# Patient Record
Sex: MALE | Race: WHITE | URBAN - METROPOLITAN AREA
[De-identification: names, ages, dates, MRNs, and addresses within clinical notes are randomized per-mention and may not be internally consistent; named-entity substitution may affect disease eponyms.]

---

## 2017-09-26 ENCOUNTER — TELEPHONE (OUTPATIENT)
Dept: ADMINISTRATIVE | Age: 77
End: 2017-09-26

## 2019-01-26 LAB
ALBUMIN SERPL-MCNC: 4.5 G/DL (ref 3.9–4.9)
ALP BLD-CCNC: 62 U/L (ref 35–104)
ALT SERPL-CCNC: 16 U/L (ref 0–41)
AST SERPL-CCNC: 21 U/L (ref 0–40)
BILIRUB SERPL-MCNC: 0.7 MG/DL (ref 0–1.2)
BILIRUBIN DIRECT: <0.2 MG/DL (ref 0–0.3)
BILIRUBIN, INDIRECT: NORMAL MG/DL (ref 0–0.6)
CHOLESTEROL, TOTAL: 96 MG/DL (ref 0–199)
HDLC SERPL-MCNC: 45 MG/DL (ref 40–59)
LDL CHOLESTEROL CALCULATED: 34 MG/DL (ref 0–129)
TOTAL PROTEIN: 7.2 G/DL (ref 6.4–8.1)
TRIGL SERPL-MCNC: 85 MG/DL (ref 0–200)

## 2024-01-21 ENCOUNTER — HOSPITAL ENCOUNTER (EMERGENCY)
Age: 84
Discharge: HOME | End: 2024-01-21
Attending: FAMILY MEDICINE
Payer: MEDICARE

## 2024-01-21 ENCOUNTER — APPOINTMENT (OUTPATIENT)
Dept: GENERAL RADIOLOGY | Age: 84
End: 2024-01-21
Payer: MEDICARE

## 2024-01-21 VITALS
SYSTOLIC BLOOD PRESSURE: 136 MMHG | BODY MASS INDEX: 27.74 KG/M2 | OXYGEN SATURATION: 96 % | HEART RATE: 66 BPM | HEIGHT: 68 IN | DIASTOLIC BLOOD PRESSURE: 86 MMHG | TEMPERATURE: 97.3 F | RESPIRATION RATE: 16 BRPM | WEIGHT: 183 LBS

## 2024-01-21 DIAGNOSIS — R06.02 SHORTNESS OF BREATH: Primary | ICD-10-CM

## 2024-01-21 LAB
ALBUMIN SERPL-MCNC: 4.4 G/DL (ref 3.5–4.6)
ALP SERPL-CCNC: 78 U/L (ref 35–104)
ALT SERPL-CCNC: 8 U/L (ref 0–41)
ANION GAP SERPL CALCULATED.3IONS-SCNC: 14 MEQ/L (ref 9–15)
AST SERPL-CCNC: 15 U/L (ref 0–40)
BASOPHILS # BLD: 0.1 K/UL (ref 0–0.2)
BASOPHILS NFR BLD: 1 %
BILIRUB SERPL-MCNC: 0.5 MG/DL (ref 0.2–0.7)
BNP BLD-MCNC: 246 PG/ML
BUN SERPL-MCNC: 14 MG/DL (ref 8–23)
CALCIUM SERPL-MCNC: 9 MG/DL (ref 8.5–9.9)
CHLORIDE SERPL-SCNC: 101 MEQ/L (ref 95–107)
CO2 SERPL-SCNC: 24 MEQ/L (ref 20–31)
CREAT SERPL-MCNC: 1.32 MG/DL (ref 0.7–1.2)
EOSINOPHIL # BLD: 0 K/UL (ref 0–0.7)
EOSINOPHIL NFR BLD: 0.1 %
ERYTHROCYTE [DISTWIDTH] IN BLOOD BY AUTOMATED COUNT: 13.3 % (ref 11.5–14.5)
GLOBULIN SER CALC-MCNC: 3.1 G/DL (ref 2.3–3.5)
GLUCOSE SERPL-MCNC: 157 MG/DL (ref 70–99)
HCT VFR BLD AUTO: 46.2 % (ref 42–52)
HGB BLD-MCNC: 15.7 G/DL (ref 14–18)
LYMPHOCYTES # BLD: 2.2 K/UL (ref 1–4.8)
LYMPHOCYTES NFR BLD: 16 %
MAGNESIUM SERPL-MCNC: 1.9 MG/DL (ref 1.7–2.4)
MCH RBC QN AUTO: 29.8 PG (ref 27–31.3)
MCHC RBC AUTO-ENTMCNC: 34 % (ref 33–37)
MCV RBC AUTO: 87.8 FL (ref 79–92.2)
MONOCYTES # BLD: 1.3 K/UL (ref 0.2–0.8)
MONOCYTES NFR BLD: 19 %
MYELOCYTES NFR BLD MANUAL: 1 %
NEUTROPHILS # BLD: 3.4 K/UL (ref 1.4–6.5)
NEUTS SEG NFR BLD: 48 %
PATH INTERP BLD-IMP: YES
PLATELET # BLD AUTO: 148 K/UL (ref 130–400)
PLATELET BLD QL SMEAR: ADEQUATE
POLYCHROMASIA BLD QL SMEAR: ABNORMAL
POTASSIUM SERPL-SCNC: 3.5 MEQ/L (ref 3.4–4.9)
PROT SERPL-MCNC: 7.5 G/DL (ref 6.3–8)
RBC # BLD AUTO: 5.26 M/UL (ref 4.7–6.1)
SODIUM SERPL-SCNC: 139 MEQ/L (ref 135–144)
TROPONIN, HIGH SENSITIVITY: 21 NG/L (ref 0–19)
TROPONIN, HIGH SENSITIVITY: 22 NG/L (ref 0–19)
VARIANT LYMPHS NFR BLD: 15 %
WBC # BLD AUTO: 7 K/UL (ref 4.8–10.8)

## 2024-01-21 PROCEDURE — 80053 COMPREHEN METABOLIC PANEL: CPT

## 2024-01-21 PROCEDURE — 36415 COLL VENOUS BLD VENIPUNCTURE: CPT

## 2024-01-21 PROCEDURE — 85025 COMPLETE CBC W/AUTO DIFF WBC: CPT

## 2024-01-21 PROCEDURE — 84484 ASSAY OF TROPONIN QUANT: CPT

## 2024-01-21 PROCEDURE — 83735 ASSAY OF MAGNESIUM: CPT

## 2024-01-21 PROCEDURE — 99285 EMERGENCY DEPT VISIT HI MDM: CPT

## 2024-01-21 PROCEDURE — 93005 ELECTROCARDIOGRAM TRACING: CPT | Performed by: FAMILY MEDICINE

## 2024-01-21 PROCEDURE — 71045 X-RAY EXAM CHEST 1 VIEW: CPT

## 2024-01-21 PROCEDURE — 83880 ASSAY OF NATRIURETIC PEPTIDE: CPT

## 2024-01-21 ASSESSMENT — LIFESTYLE VARIABLES
HOW OFTEN DO YOU HAVE A DRINK CONTAINING ALCOHOL: NEVER
HOW MANY STANDARD DRINKS CONTAINING ALCOHOL DO YOU HAVE ON A TYPICAL DAY: PATIENT DOES NOT DRINK

## 2024-01-21 ASSESSMENT — PAIN - FUNCTIONAL ASSESSMENT: PAIN_FUNCTIONAL_ASSESSMENT: NONE - DENIES PAIN

## 2024-01-21 NOTE — ED TRIAGE NOTES
Pt arrived today d/t intermittent SOB that occurs even if he if sitting down.  Pt denies COPD or asthma.  Pt denies any cold symptoms.

## 2024-01-22 LAB — PATH INTERP BLD-IMP: NORMAL

## 2024-01-23 LAB
EKG ATRIAL RATE: 85 BPM
EKG P AXIS: 73 DEGREES
EKG P-R INTERVAL: 170 MS
EKG Q-T INTERVAL: 378 MS
EKG QRS DURATION: 80 MS
EKG QTC CALCULATION (BAZETT): 449 MS
EKG R AXIS: 58 DEGREES
EKG T AXIS: 41 DEGREES
EKG VENTRICULAR RATE: 85 BPM

## 2024-01-23 PROCEDURE — 93010 ELECTROCARDIOGRAM REPORT: CPT | Performed by: INTERNAL MEDICINE

## 2024-02-06 ENCOUNTER — APPOINTMENT (OUTPATIENT)
Dept: GENERAL RADIOLOGY | Age: 84
End: 2024-02-06
Payer: MEDICARE

## 2024-02-06 ENCOUNTER — HOSPITAL ENCOUNTER (EMERGENCY)
Age: 84
Discharge: HOME | End: 2024-02-06
Payer: MEDICARE

## 2024-02-06 VITALS
RESPIRATION RATE: 13 BRPM | WEIGHT: 190 LBS | SYSTOLIC BLOOD PRESSURE: 123 MMHG | HEART RATE: 61 BPM | TEMPERATURE: 97.8 F | DIASTOLIC BLOOD PRESSURE: 73 MMHG | OXYGEN SATURATION: 93 % | BODY MASS INDEX: 25.73 KG/M2 | HEIGHT: 72 IN

## 2024-02-06 DIAGNOSIS — R06.02 SHORTNESS OF BREATH: Primary | ICD-10-CM

## 2024-02-06 LAB
ALBUMIN SERPL-MCNC: 4.5 G/DL (ref 3.5–4.6)
ALP SERPL-CCNC: 82 U/L (ref 35–104)
ALT SERPL-CCNC: <5 U/L (ref 0–41)
ANION GAP SERPL CALCULATED.3IONS-SCNC: 10 MEQ/L (ref 9–15)
ANISOCYTOSIS BLD QL SMEAR: ABNORMAL
AST SERPL-CCNC: 13 U/L (ref 0–40)
BASOPHILS # BLD: 0 K/UL (ref 0–0.2)
BASOPHILS NFR BLD: 0.1 %
BILIRUB SERPL-MCNC: 0.5 MG/DL (ref 0.2–0.7)
BNP BLD-MCNC: 145 PG/ML
BUN SERPL-MCNC: 12 MG/DL (ref 8–23)
BURR CELLS: ABNORMAL
CALCIUM SERPL-MCNC: 9.2 MG/DL (ref 8.5–9.9)
CHLORIDE SERPL-SCNC: 98 MEQ/L (ref 95–107)
CO2 SERPL-SCNC: 28 MEQ/L (ref 20–31)
CREAT SERPL-MCNC: 1.37 MG/DL (ref 0.7–1.2)
EKG ATRIAL RATE: 62 BPM
EKG P AXIS: 79 DEGREES
EKG P-R INTERVAL: 178 MS
EKG Q-T INTERVAL: 422 MS
EKG QRS DURATION: 80 MS
EKG QTC CALCULATION (BAZETT): 428 MS
EKG R AXIS: 67 DEGREES
EKG T AXIS: 67 DEGREES
EKG VENTRICULAR RATE: 62 BPM
EOSINOPHIL # BLD: 0 K/UL (ref 0–0.7)
EOSINOPHIL NFR BLD: 0.4 %
ERYTHROCYTE [DISTWIDTH] IN BLOOD BY AUTOMATED COUNT: 13.3 % (ref 11.5–14.5)
GLOBULIN SER CALC-MCNC: 3 G/DL (ref 2.3–3.5)
GLUCOSE SERPL-MCNC: 116 MG/DL (ref 70–99)
HCT VFR BLD AUTO: 47.1 % (ref 42–52)
HGB BLD-MCNC: 15.5 G/DL (ref 14–18)
LYMPHOCYTES # BLD: 1.2 K/UL (ref 1–4.8)
LYMPHOCYTES NFR BLD: 9 %
MCH RBC QN AUTO: 29.6 PG (ref 27–31.3)
MCHC RBC AUTO-ENTMCNC: 32.9 % (ref 33–37)
MCV RBC AUTO: 89.9 FL (ref 79–92.2)
MONOCYTES # BLD: 1.5 K/UL (ref 0.2–0.8)
MONOCYTES NFR BLD: 22.1 %
MYELOCYTES NFR BLD MANUAL: 1 %
NEUTROPHILS # BLD: 4.3 K/UL (ref 1.4–6.5)
NEUTS SEG NFR BLD: 61 %
PLATELET # BLD AUTO: 177 K/UL (ref 130–400)
POIKILOCYTOSIS BLD QL SMEAR: ABNORMAL
POTASSIUM SERPL-SCNC: 4 MEQ/L (ref 3.4–4.9)
PROT SERPL-MCNC: 7.5 G/DL (ref 6.3–8)
RBC # BLD AUTO: 5.24 M/UL (ref 4.7–6.1)
SMUDGE CELLS BLD QL SMEAR: 1
SODIUM SERPL-SCNC: 136 MEQ/L (ref 135–144)
TROPONIN, HIGH SENSITIVITY: 23 NG/L (ref 0–19)
VARIANT LYMPHS NFR BLD: 8 %
WBC # BLD AUTO: 7 K/UL (ref 4.8–10.8)

## 2024-02-06 PROCEDURE — 36415 COLL VENOUS BLD VENIPUNCTURE: CPT

## 2024-02-06 PROCEDURE — 93005 ELECTROCARDIOGRAM TRACING: CPT | Performed by: PERSONAL EMERGENCY RESPONSE ATTENDANT

## 2024-02-06 PROCEDURE — 84484 ASSAY OF TROPONIN QUANT: CPT

## 2024-02-06 PROCEDURE — 99285 EMERGENCY DEPT VISIT HI MDM: CPT

## 2024-02-06 PROCEDURE — 85025 COMPLETE CBC W/AUTO DIFF WBC: CPT

## 2024-02-06 PROCEDURE — 71045 X-RAY EXAM CHEST 1 VIEW: CPT

## 2024-02-06 PROCEDURE — 80053 COMPREHEN METABOLIC PANEL: CPT

## 2024-02-06 PROCEDURE — 93010 ELECTROCARDIOGRAM REPORT: CPT | Performed by: INTERNAL MEDICINE

## 2024-02-06 PROCEDURE — 83880 ASSAY OF NATRIURETIC PEPTIDE: CPT

## 2024-02-06 ASSESSMENT — ENCOUNTER SYMPTOMS
ANAL BLEEDING: 0
VOMITING: 0
VOICE CHANGE: 0
EYE DISCHARGE: 0
APNEA: 0
ABDOMINAL DISTENTION: 0
NAUSEA: 0
COUGH: 0
SHORTNESS OF BREATH: 1

## 2024-02-06 ASSESSMENT — PAIN - FUNCTIONAL ASSESSMENT: PAIN_FUNCTIONAL_ASSESSMENT: 0-10

## 2024-02-06 NOTE — ED TRIAGE NOTES
Pt here for dyspnea after laying down. Patient states he tried to lay down several times tonight, and each time felt he could not breathe. Patient felt okay after getting up. Patient has cardiac hx, pt reports he has three stents. Patient is not on any regular medications. Patient states he was here two weeks ago for the same thing but was discharged home because nothing was found. Patient denies chest pain and SOB presently.

## 2024-02-06 NOTE — ED PROVIDER NOTES
and atherosclerotic disease.  No new   cardiopulmonary pathology seen.               ED BEDSIDE ULTRASOUND:   Performed by ED Physician - none    LABS:  Labs Reviewed   CBC WITH AUTO DIFFERENTIAL - Abnormal; Notable for the following components:       Result Value    MCHC 32.9 (*)     Monocytes Absolute 1.5 (*)     All other components within normal limits   COMPREHENSIVE METABOLIC PANEL - Abnormal; Notable for the following components:    Glucose 116 (*)     Creatinine 1.37 (*)     Est, Glom Filt Rate 50.9 (*)     All other components within normal limits   TROPONIN - Abnormal; Notable for the following components:    Troponin, High Sensitivity 23 (*)     All other components within normal limits   BRAIN NATRIURETIC PEPTIDE       All other labs were within normal range or not returned as of this dictation.    EMERGENCY DEPARTMENT COURSE and DIFFERENTIAL DIAGNOSIS/MDM:   Vitals:    Vitals:    02/06/24 0030 02/06/24 0133 02/06/24 0200 02/06/24 0228   BP: (!) 154/83 134/89 121/73 123/73   Pulse: 71 60 61 61   Resp: 20  14 13   Temp: 97.8 °F (36.6 °C)      SpO2: 97% 100% 97% 93%   Weight: 86.2 kg (190 lb)      Height: 1.829 m (6')               Medical Decision Making  presents to the emergency department patient's had shortness of breath intermittently x 2 weeks denies any fever chills cough congestion states is worse in the last time.  He was seen for similar symptoms 2 weeks ago did not follow-up does not take any medications currently.  Currently has no symptoms feels better.  Discussed with DHIRAJ morin she will avcccept care and generated orders.            Amount and/or Complexity of Data Reviewed  Labs: ordered.  Radiology: ordered.  ECG/medicine tests: ordered.      Radiologist interpreting:  Chest x-ray shows coarsened interstitial markings and atherosclerotic disease.  No acute process    Troponin 23 (near baseline)    Pt laid flat in bed without SOB or hypoxia. Ambulated with no SOB and 99% RA.  Patient is

## 2024-02-06 NOTE — ED NOTES

## 2024-02-07 ENCOUNTER — APPOINTMENT (OUTPATIENT)
Dept: LAB | Facility: LAB | Age: 84
End: 2024-02-07
Payer: MEDICARE

## 2024-02-07 ENCOUNTER — LAB (OUTPATIENT)
Dept: LAB | Facility: LAB | Age: 84
End: 2024-02-07
Payer: MEDICARE

## 2024-02-07 ENCOUNTER — OFFICE VISIT (OUTPATIENT)
Dept: CARDIOLOGY | Facility: CLINIC | Age: 84
End: 2024-02-07
Payer: MEDICARE

## 2024-02-07 VITALS
HEIGHT: 69 IN | SYSTOLIC BLOOD PRESSURE: 124 MMHG | BODY MASS INDEX: 27.99 KG/M2 | HEART RATE: 71 BPM | WEIGHT: 189 LBS | DIASTOLIC BLOOD PRESSURE: 84 MMHG

## 2024-02-07 DIAGNOSIS — R07.9 CHEST PAIN, UNSPECIFIED TYPE: Primary | ICD-10-CM

## 2024-02-07 DIAGNOSIS — R06.02 SHORTNESS OF BREATH: ICD-10-CM

## 2024-02-07 DIAGNOSIS — I20.0 CRESCENDO ANGINA (MULTI): ICD-10-CM

## 2024-02-07 DIAGNOSIS — Z09 HOSPITAL DISCHARGE FOLLOW-UP: ICD-10-CM

## 2024-02-07 DIAGNOSIS — H91.90 HEARING LOSS, UNSPECIFIED HEARING LOSS TYPE, UNSPECIFIED LATERALITY: ICD-10-CM

## 2024-02-07 DIAGNOSIS — Z95.5 STENTED CORONARY ARTERY: ICD-10-CM

## 2024-02-07 LAB
ANION GAP SERPL CALC-SCNC: 14 MMOL/L (ref 10–20)
BUN SERPL-MCNC: 13 MG/DL (ref 6–23)
CALCIUM SERPL-MCNC: 9.6 MG/DL (ref 8.6–10.3)
CHLORIDE SERPL-SCNC: 101 MMOL/L (ref 98–107)
CO2 SERPL-SCNC: 28 MMOL/L (ref 21–32)
CREAT SERPL-MCNC: 1.41 MG/DL (ref 0.5–1.3)
EGFRCR SERPLBLD CKD-EPI 2021: 49 ML/MIN/1.73M*2
ERYTHROCYTE [DISTWIDTH] IN BLOOD BY AUTOMATED COUNT: 13.7 % (ref 11.5–14.5)
GLUCOSE SERPL-MCNC: 93 MG/DL (ref 74–99)
HCT VFR BLD AUTO: 47.2 % (ref 41–52)
HGB BLD-MCNC: 15.6 G/DL (ref 13.5–17.5)
INR PPP: 1.1 (ref 0.9–1.1)
MCH RBC QN AUTO: 30.1 PG (ref 26–34)
MCHC RBC AUTO-ENTMCNC: 33.1 G/DL (ref 32–36)
MCV RBC AUTO: 91 FL (ref 80–100)
NRBC BLD-RTO: 0 /100 WBCS (ref 0–0)
PLATELET # BLD AUTO: 162 X10*3/UL (ref 150–450)
POTASSIUM SERPL-SCNC: 3.9 MMOL/L (ref 3.5–5.3)
PROTHROMBIN TIME: 11.9 SECONDS (ref 9.8–12.8)
RBC # BLD AUTO: 5.18 X10*6/UL (ref 4.5–5.9)
SODIUM SERPL-SCNC: 139 MMOL/L (ref 136–145)
WBC # BLD AUTO: 8 X10*3/UL (ref 4.4–11.3)

## 2024-02-07 PROCEDURE — 1159F MED LIST DOCD IN RCRD: CPT | Performed by: INTERNAL MEDICINE

## 2024-02-07 PROCEDURE — 1036F TOBACCO NON-USER: CPT | Performed by: INTERNAL MEDICINE

## 2024-02-07 PROCEDURE — 36415 COLL VENOUS BLD VENIPUNCTURE: CPT

## 2024-02-07 PROCEDURE — 85027 COMPLETE CBC AUTOMATED: CPT

## 2024-02-07 PROCEDURE — 1160F RVW MEDS BY RX/DR IN RCRD: CPT | Performed by: INTERNAL MEDICINE

## 2024-02-07 PROCEDURE — 80048 BASIC METABOLIC PNL TOTAL CA: CPT

## 2024-02-07 PROCEDURE — 99215 OFFICE O/P EST HI 40 MIN: CPT | Performed by: INTERNAL MEDICINE

## 2024-02-07 PROCEDURE — 93000 ELECTROCARDIOGRAM COMPLETE: CPT | Performed by: INTERNAL MEDICINE

## 2024-02-07 PROCEDURE — 85610 PROTHROMBIN TIME: CPT

## 2024-02-07 RX ORDER — NITROGLYCERIN 0.4 MG/1
TABLET SUBLINGUAL EVERY 5 MIN PRN
COMMUNITY

## 2024-02-07 RX ORDER — METOPROLOL SUCCINATE 25 MG/1
25 TABLET, EXTENDED RELEASE ORAL DAILY
Qty: 100 TABLET | Refills: 1 | Status: SHIPPED | OUTPATIENT
Start: 2024-02-07 | End: 2024-02-15 | Stop reason: SDUPTHER

## 2024-02-07 RX ORDER — ATORVASTATIN CALCIUM 40 MG/1
40 TABLET, FILM COATED ORAL DAILY
Qty: 100 TABLET | Refills: 1 | Status: SHIPPED | OUTPATIENT
Start: 2024-02-07 | End: 2024-02-15 | Stop reason: SDUPTHER

## 2024-02-07 RX ORDER — ASPIRIN 81 MG/1
1 TABLET ORAL DAILY
COMMUNITY

## 2024-02-07 RX ORDER — ISOSORBIDE MONONITRATE 30 MG/1
30 TABLET, EXTENDED RELEASE ORAL DAILY
Qty: 100 TABLET | Refills: 1 | Status: SHIPPED | OUTPATIENT
Start: 2024-02-07 | End: 2024-02-15 | Stop reason: SDUPTHER

## 2024-02-07 NOTE — H&P (VIEW-ONLY)
83-year-old with atherosclerotic native vessel coronary artery disease and preserved LV systolic function, was most recently seen by me in October of 2022.  He had 2 ER visits at [facility] recently, initially on 1/21/2024 and subsequently yesterday, with shortness of breath and chest discomfort his EKG did not show acute ST-T abnormalities and his troponin was negative, so he was discharged home with recommendation for outpatient follow-up with us.  Subjective :     Very hard of hearing  Takes only baby aspirin and has been reluctant to take any other medication  Has been noticing worsening shortness of breath and chest tightness in the last 2 to 3 weeks.  At times he says it gets extremely difficult for him to breathe.  Sometimes he wakes up at night and he is finding it very difficult to breathe he says that he feels like he is going to pass out does not report any palpitations or lightheaded spells.      History so Far :  1. Atherosclerotic native vessel coronary artery disease with  percutaneous coronary intervention and stenting of the distal AV  groove left circumflex artery with a 2.75 x 13 mm Cypher drug-eluting  stent in the setting of acute coronary syndrome in 2008.  2. Balloon angioplasty of subtotally occluded circumflex in January 2011 at which time, stenting was not performed.  3. Dyslipidemia.  4. Refuses immunizations  5. Preserved LV systolic function.  6. Body mass index is slightly elevated and puts him in the overweight category, but he follows a very active heart healthy lifestyle and no major dietary modification is warranted  7. The patient is staying active.  8. The patient has consistently refused Prevnar despite  understanding its value.  9'. CKD stage 3,GFR 55 10/18. No more recent lab data available and patient refuses to get blood work  10. Hard of hearing    Objective   Failed to redirect to the Timeline version of the [employer] SmartLink.   Wt Readings from Last 3  "Encounters:   02/07/24 85.7 kg (189 lb)   10/18/22 84.4 kg (186 lb)   10/14/21 83 kg (183 lb)        Visit Vitals  /84 (BP Location: Left arm, Patient Position: Sitting)   Pulse 71   Ht 1.753 m (5' 9\")   Wt 85.7 kg (189 lb)   BMI 27.91 kg/m²   Smoking Status Former   BSA 2.04 m²            Physical Exam:    GENERAL APPEARANCE: in no acute distress.  Very hard of hearing  CHEST: Symmetric and non-tender.  INTEGUMENT: Skin warm and dry  HEENT: No gross abnormalities identified.No pallor or scleral icterus.  NECK: Supple, no JVD, no bruit.   NEURO/PSHCY: Alert and oriented x3; appropriate behavior and responses and responses  LUNGS: Clear to auscultation bilaterally; normal respiratory effort.  HEART: Rate and rhythm regular with no evident murmur; no gallop appreciated.   ABDOMEN: Soft, non tender.  MUSCULOSKELETAL: No gross deformities.  EXTREMITIES: Warm  There is no edema noted.    Meds:  Current Outpatient Medications   Medication Instructions    aspirin 81 mg EC tablet 1 tablet, oral, Daily    atorvastatin (LIPITOR) 40 mg, oral, Daily    isosorbide mononitrate ER (IMDUR) 30 mg, oral, Daily, Do not crush or chew.    metoprolol succinate XL (TOPROL-XL) 25 mg, oral, Daily, Do not crush or chew.    nitroglycerin (Nitrostat) 0.4 mg SL tablet sublingual, Every 5 min PRN          No Known Allergies    LABS:    Reviewed laboratory data from yesterday, proBNP level 145 sodium 139 potassium 3.5 glucose 157 creatinine 1.32 GFR 53    Patient Active Problem List    Diagnosis Date Noted    Shortness of breath 02/07/2024    Crescendo angina (CMS/[facility]) 02/07/2024    Stented coronary artery 02/07/2024                 Assessment:    1. Chest pain, unspecified type  ECG 12 lead (Clinic Performed)      2. Shortness of breath  Case Request Cath Lab: Left Heart Cath, Angioplasty - Coronary    Transthoracic Echo Complete    metoprolol succinate XL (Toprol-XL) 25 mg 24 hr tablet    Basic Metabolic Panel    CBC    Protime-INR    "   3. Crescendo angina (CMS/HCC)  Case Request Cath Lab: Left Heart Cath, Angioplasty - Coronary    isosorbide mononitrate ER (Imdur) 30 mg 24 hr tablet    metoprolol succinate XL (Toprol-XL) 25 mg 24 hr tablet    Basic Metabolic Panel    CBC    Protime-INR      4. Stented coronary artery  Case Request Cath Lab: Left Heart Cath, Angioplasty - Coronary    isosorbide mononitrate ER (Imdur) 30 mg 24 hr tablet    atorvastatin (Lipitor) 40 mg tablet    Basic Metabolic Panel    CBC    Protime-INR      5. Hospital discharge follow-up        6. Hearing loss, unspecified hearing loss type, unspecified laterality           Given this patient's history, and presentation, differential diagnosis is high for new onset/crescendo angina pectoris.  Has had 2 ER visits to [facility]  Discussed the need for coronary angiography and possible intervention  Rationale for cardiac catheterization and possible intervention was discussed.  Procedure, risks, benefits, and alternatives were discussed.  Procedure was explained to patient in layman's terms ie the catheters would be introduced under local anesthetic via the right groin or the right radial approach, and under x-ray guidance catheters would be positioned in the coronary arteries and pictures would be taken.  This will be followed by opening up of blockages using balloon tipped catheters, followed by placement of stents within the arteries.  Each stent is described as a metal scaffolding that keeps the artery open .  Risks discussed included, but were not limited to MI, stroke, death, peripheral vascular compromise, allergic reaction to dye, bleeding complications, and kidney injury.  The very rare occurrence of needing emergency coronary artery bypass grafting was also discussed.  When the procedure is done at in a facility that does not do coronary artery bypass grafting  on site, the patient would be  transferred to a facility that has bypass  capability.    Patient expressed understanding and agrees to the procedure.  He is very adamant that there is nobody to bring him to the hospital.  He understands that if symptoms escalate he needs to seek emergent medical attention.  The secretaries had made phone calls, and provide a ride is available for him.  He is scheduled for coronary angiography this Friday at [facility].  He may have surgical disease.    Other differential diagnosis is some type of paroxysmal dysrhythmia such as paroxysmal atrial fibrillation.  Third possibility is deterioration in LV systolic function.    Patient should continue aspirin.  I am adding isosorbide mononitrate 30 mg p.o. daily and metoprolol succinate 25 mg p.o. daily, atorvastatin 40 mg daily.  Patient will follow-up with me after testing.  Given that the procedure is not until 2 PM, he is allowed to have light breakfast before 8 AM    Follow up : after testing        Provider Attestation - Scribe documentation    All medical record entries made by the Scribe were at my direction and personally dictated by me. I have reviewed the chart and agree that the record accurately reflects my personal performance of the history, physical exam, discussion and plan.         Scribe Attestation  By signing my name below, I, Ariana Juarez MD , Scribe   attest that this documentation has been prepared under the direction and in the presence of Ariana Juarez MD.

## 2024-02-07 NOTE — PATIENT INSTRUCTIONS
Start Metoprolol Succinate 25 mg daily  Start Isosorbide 30 mg daily  Start Atorvastatin 40 mg every night  All new prescriptions sent to [facility].    Labs to be done 2-3 days prior to craterization. Orders for labs are already in the computer just show up to a  lab to have drawn.    Take all medication as usual with a small sip of water    Please bring all medicines, vitamins, and herbal supplements with you in original bottles to every appointment!!!!    Prescriptions will not be filled unless you are compliant with your follow up appointments or have a follow up appointment scheduled as per instruction of your physician. Refills should be requested at the time of your visit.

## 2024-02-07 NOTE — PROGRESS NOTES
83-year-old with atherosclerotic native vessel coronary artery disease and preserved LV systolic function, was most recently seen by me in October of 2022.  He had 2 ER visits at [facility] recently, initially on 1/21/2024 and subsequently yesterday, with shortness of breath and chest discomfort his EKG did not show acute ST-T abnormalities and his troponin was negative, so he was discharged home with recommendation for outpatient follow-up with us.  Subjective :     Very hard of hearing  Takes only baby aspirin and has been reluctant to take any other medication  Has been noticing worsening shortness of breath and chest tightness in the last 2 to 3 weeks.  At times he says it gets extremely difficult for him to breathe.  Sometimes he wakes up at night and he is finding it very difficult to breathe he says that he feels like he is going to pass out does not report any palpitations or lightheaded spells.      History so Far :  1. Atherosclerotic native vessel coronary artery disease with  percutaneous coronary intervention and stenting of the distal AV  groove left circumflex artery with a 2.75 x 13 mm Cypher drug-eluting  stent in the setting of acute coronary syndrome in 2008.  2. Balloon angioplasty of subtotally occluded circumflex in January 2011 at which time, stenting was not performed.  3. Dyslipidemia.  4. Refuses immunizations  5. Preserved LV systolic function.  6. Body mass index is slightly elevated and puts him in the overweight category, but he follows a very active heart healthy lifestyle and no major dietary modification is warranted  7. The patient is staying active.  8. The patient has consistently refused Prevnar despite  understanding its value.  9'. CKD stage 3,GFR 55 10/18. No more recent lab data available and patient refuses to get blood work  10. Hard of hearing    Objective   Failed to redirect to the Timeline version of the [employer] SmartLink.   Wt Readings from Last 3  "Encounters:   02/07/24 85.7 kg (189 lb)   10/18/22 84.4 kg (186 lb)   10/14/21 83 kg (183 lb)        Visit Vitals  /84 (BP Location: Left arm, Patient Position: Sitting)   Pulse 71   Ht 1.753 m (5' 9\")   Wt 85.7 kg (189 lb)   BMI 27.91 kg/m²   Smoking Status Former   BSA 2.04 m²            Physical Exam:    GENERAL APPEARANCE: in no acute distress.  Very hard of hearing  CHEST: Symmetric and non-tender.  INTEGUMENT: Skin warm and dry  HEENT: No gross abnormalities identified.No pallor or scleral icterus.  NECK: Supple, no JVD, no bruit.   NEURO/PSHCY: Alert and oriented x3; appropriate behavior and responses and responses  LUNGS: Clear to auscultation bilaterally; normal respiratory effort.  HEART: Rate and rhythm regular with no evident murmur; no gallop appreciated.   ABDOMEN: Soft, non tender.  MUSCULOSKELETAL: No gross deformities.  EXTREMITIES: Warm  There is no edema noted.    Meds:  Current Outpatient Medications   Medication Instructions    aspirin 81 mg EC tablet 1 tablet, oral, Daily    atorvastatin (LIPITOR) 40 mg, oral, Daily    isosorbide mononitrate ER (IMDUR) 30 mg, oral, Daily, Do not crush or chew.    metoprolol succinate XL (TOPROL-XL) 25 mg, oral, Daily, Do not crush or chew.    nitroglycerin (Nitrostat) 0.4 mg SL tablet sublingual, Every 5 min PRN          No Known Allergies    LABS:    Reviewed laboratory data from yesterday, proBNP level 145 sodium 139 potassium 3.5 glucose 157 creatinine 1.32 GFR 53    Patient Active Problem List    Diagnosis Date Noted    Shortness of breath 02/07/2024    Crescendo angina (CMS/[facility]) 02/07/2024    Stented coronary artery 02/07/2024                 Assessment:    1. Chest pain, unspecified type  ECG 12 lead (Clinic Performed)      2. Shortness of breath  Case Request Cath Lab: Left Heart Cath, Angioplasty - Coronary    Transthoracic Echo Complete    metoprolol succinate XL (Toprol-XL) 25 mg 24 hr tablet    Basic Metabolic Panel    CBC    Protime-INR    "   3. Crescendo angina (CMS/HCC)  Case Request Cath Lab: Left Heart Cath, Angioplasty - Coronary    isosorbide mononitrate ER (Imdur) 30 mg 24 hr tablet    metoprolol succinate XL (Toprol-XL) 25 mg 24 hr tablet    Basic Metabolic Panel    CBC    Protime-INR      4. Stented coronary artery  Case Request Cath Lab: Left Heart Cath, Angioplasty - Coronary    isosorbide mononitrate ER (Imdur) 30 mg 24 hr tablet    atorvastatin (Lipitor) 40 mg tablet    Basic Metabolic Panel    CBC    Protime-INR      5. Hospital discharge follow-up        6. Hearing loss, unspecified hearing loss type, unspecified laterality           Given this patient's history, and presentation, differential diagnosis is high for new onset/crescendo angina pectoris.  Has had 2 ER visits to [facility]  Discussed the need for coronary angiography and possible intervention  Rationale for cardiac catheterization and possible intervention was discussed.  Procedure, risks, benefits, and alternatives were discussed.  Procedure was explained to patient in layman's terms ie the catheters would be introduced under local anesthetic via the right groin or the right radial approach, and under x-ray guidance catheters would be positioned in the coronary arteries and pictures would be taken.  This will be followed by opening up of blockages using balloon tipped catheters, followed by placement of stents within the arteries.  Each stent is described as a metal scaffolding that keeps the artery open .  Risks discussed included, but were not limited to MI, stroke, death, peripheral vascular compromise, allergic reaction to dye, bleeding complications, and kidney injury.  The very rare occurrence of needing emergency coronary artery bypass grafting was also discussed.  When the procedure is done at in a facility that does not do coronary artery bypass grafting  on site, the patient would be  transferred to a facility that has bypass  capability.    Patient expressed understanding and agrees to the procedure.  He is very adamant that there is nobody to bring him to the hospital.  He understands that if symptoms escalate he needs to seek emergent medical attention.  The secretaries had made phone calls, and provide a ride is available for him.  He is scheduled for coronary angiography this Friday at [facility].  He may have surgical disease.    Other differential diagnosis is some type of paroxysmal dysrhythmia such as paroxysmal atrial fibrillation.  Third possibility is deterioration in LV systolic function.    Patient should continue aspirin.  I am adding isosorbide mononitrate 30 mg p.o. daily and metoprolol succinate 25 mg p.o. daily, atorvastatin 40 mg daily.  Patient will follow-up with me after testing.  Given that the procedure is not until 2 PM, he is allowed to have light breakfast before 8 AM    Follow up : after testing        Provider Attestation - Scribe documentation    All medical record entries made by the Scribe were at my direction and personally dictated by me. I have reviewed the chart and agree that the record accurately reflects my personal performance of the history, physical exam, discussion and plan.         Scribe Attestation  By signing my name below, I, Ariana Juarez MD , Scribe   attest that this documentation has been prepared under the direction and in the presence of Ariana Juarez MD.

## 2024-02-09 ENCOUNTER — APPOINTMENT (OUTPATIENT)
Dept: CARDIOLOGY | Facility: HOSPITAL | Age: 84
End: 2024-02-09
Payer: MEDICARE

## 2024-02-09 ENCOUNTER — HOSPITAL ENCOUNTER (OUTPATIENT)
Facility: HOSPITAL | Age: 84
Discharge: HOME | End: 2024-02-10
Attending: INTERNAL MEDICINE | Admitting: INTERNAL MEDICINE
Payer: MEDICARE

## 2024-02-09 DIAGNOSIS — I20.9 ANGINA PECTORIS, UNSPECIFIED (CMS-HCC): ICD-10-CM

## 2024-02-09 DIAGNOSIS — R06.02 SHORTNESS OF BREATH: Primary | ICD-10-CM

## 2024-02-09 DIAGNOSIS — I25.752: ICD-10-CM

## 2024-02-09 DIAGNOSIS — Z95.5 STENTED CORONARY ARTERY: ICD-10-CM

## 2024-02-09 DIAGNOSIS — I20.0 CRESCENDO ANGINA (MULTI): ICD-10-CM

## 2024-02-09 PROBLEM — I25.10 CAD S/P PERCUTANEOUS CORONARY ANGIOPLASTY: Status: ACTIVE | Noted: 2024-02-09

## 2024-02-09 PROBLEM — Z98.61 CAD S/P PERCUTANEOUS CORONARY ANGIOPLASTY: Status: ACTIVE | Noted: 2024-02-09

## 2024-02-09 PROCEDURE — 7100000011 HC EXTENDED STAY RECOVERY HOURLY - NURSING UNIT

## 2024-02-09 PROCEDURE — 99153 MOD SED SAME PHYS/QHP EA: CPT | Performed by: INTERNAL MEDICINE

## 2024-02-09 PROCEDURE — C1725 CATH, TRANSLUMIN NON-LASER: HCPCS | Performed by: INTERNAL MEDICINE

## 2024-02-09 PROCEDURE — 85347 COAGULATION TIME ACTIVATED: CPT

## 2024-02-09 PROCEDURE — 93010 ELECTROCARDIOGRAM REPORT: CPT | Performed by: INTERNAL MEDICINE

## 2024-02-09 PROCEDURE — 2720000007 HC OR 272 NO HCPCS: Performed by: INTERNAL MEDICINE

## 2024-02-09 PROCEDURE — 93458 L HRT ARTERY/VENTRICLE ANGIO: CPT | Performed by: INTERNAL MEDICINE

## 2024-02-09 PROCEDURE — 2500000004 HC RX 250 GENERAL PHARMACY W/ HCPCS (ALT 636 FOR OP/ED): Performed by: NURSE PRACTITIONER

## 2024-02-09 PROCEDURE — C1887 CATHETER, GUIDING: HCPCS | Performed by: INTERNAL MEDICINE

## 2024-02-09 PROCEDURE — C1760 CLOSURE DEV, VASC: HCPCS | Performed by: INTERNAL MEDICINE

## 2024-02-09 PROCEDURE — 99152 MOD SED SAME PHYS/QHP 5/>YRS: CPT | Performed by: INTERNAL MEDICINE

## 2024-02-09 PROCEDURE — C1894 INTRO/SHEATH, NON-LASER: HCPCS | Performed by: INTERNAL MEDICINE

## 2024-02-09 PROCEDURE — 93458 L HRT ARTERY/VENTRICLE ANGIO: CPT | Mod: 59 | Performed by: INTERNAL MEDICINE

## 2024-02-09 PROCEDURE — 85347 COAGULATION TIME ACTIVATED: CPT | Mod: 59 | Performed by: INTERNAL MEDICINE

## 2024-02-09 PROCEDURE — C9600 PERC DRUG-EL COR STENT SING: HCPCS | Performed by: INTERNAL MEDICINE

## 2024-02-09 PROCEDURE — 2780000003 HC OR 278 NO HCPCS: Performed by: INTERNAL MEDICINE

## 2024-02-09 PROCEDURE — 2500000004 HC RX 250 GENERAL PHARMACY W/ HCPCS (ALT 636 FOR OP/ED): Performed by: INTERNAL MEDICINE

## 2024-02-09 PROCEDURE — 92928 PRQ TCAT PLMT NTRAC ST 1 LES: CPT | Performed by: INTERNAL MEDICINE

## 2024-02-09 PROCEDURE — 7100000010 HC PHASE TWO TIME - EACH INCREMENTAL 1 MINUTE: Performed by: INTERNAL MEDICINE

## 2024-02-09 PROCEDURE — C1874 STENT, COATED/COV W/DEL SYS: HCPCS | Performed by: INTERNAL MEDICINE

## 2024-02-09 PROCEDURE — 2500000005 HC RX 250 GENERAL PHARMACY W/O HCPCS: Performed by: INTERNAL MEDICINE

## 2024-02-09 PROCEDURE — G0269 OCCLUSIVE DEVICE IN VEIN ART: HCPCS | Mod: TC | Performed by: INTERNAL MEDICINE

## 2024-02-09 PROCEDURE — 7100000009 HC PHASE TWO TIME - INITIAL BASE CHARGE: Performed by: INTERNAL MEDICINE

## 2024-02-09 PROCEDURE — C1769 GUIDE WIRE: HCPCS | Performed by: INTERNAL MEDICINE

## 2024-02-09 PROCEDURE — 93005 ELECTROCARDIOGRAM TRACING: CPT | Mod: 59

## 2024-02-09 PROCEDURE — 2550000001 HC RX 255 CONTRASTS: Performed by: INTERNAL MEDICINE

## 2024-02-09 PROCEDURE — 2500000001 HC RX 250 WO HCPCS SELF ADMINISTERED DRUGS (ALT 637 FOR MEDICARE OP): Performed by: INTERNAL MEDICINE

## 2024-02-09 DEVICE — STENT ONYXNG30015UX ONYX 3.00X15RX
Type: IMPLANTABLE DEVICE | Site: CORONARY | Status: FUNCTIONAL
Brand: ONYX FRONTIER™

## 2024-02-09 RX ORDER — NAPROXEN SODIUM 220 MG/1
81 TABLET, FILM COATED ORAL DAILY
Status: DISCONTINUED | OUTPATIENT
Start: 2024-02-10 | End: 2024-02-10 | Stop reason: HOSPADM

## 2024-02-09 RX ORDER — METOPROLOL SUCCINATE 25 MG/1
25 TABLET, EXTENDED RELEASE ORAL DAILY
Status: DISCONTINUED | OUTPATIENT
Start: 2024-02-09 | End: 2024-02-10 | Stop reason: HOSPADM

## 2024-02-09 RX ORDER — SODIUM CHLORIDE 9 MG/ML
100 INJECTION, SOLUTION INTRAVENOUS CONTINUOUS
Status: ACTIVE | OUTPATIENT
Start: 2024-02-09 | End: 2024-02-09

## 2024-02-09 RX ORDER — NITROGLYCERIN 0.4 MG/1
0.4 TABLET SUBLINGUAL EVERY 5 MIN PRN
Status: DISCONTINUED | OUTPATIENT
Start: 2024-02-09 | End: 2024-02-10 | Stop reason: HOSPADM

## 2024-02-09 RX ORDER — ATORVASTATIN CALCIUM 20 MG/1
40 TABLET, FILM COATED ORAL DAILY
Status: DISCONTINUED | OUTPATIENT
Start: 2024-02-09 | End: 2024-02-10 | Stop reason: HOSPADM

## 2024-02-09 RX ORDER — MORPHINE SULFATE 2 MG/ML
2 INJECTION, SOLUTION INTRAMUSCULAR; INTRAVENOUS
Status: DISCONTINUED | OUTPATIENT
Start: 2024-02-09 | End: 2024-02-10 | Stop reason: HOSPADM

## 2024-02-09 RX ORDER — ACETAMINOPHEN 325 MG/1
650 TABLET ORAL EVERY 6 HOURS PRN
Status: DISCONTINUED | OUTPATIENT
Start: 2024-02-09 | End: 2024-02-10 | Stop reason: HOSPADM

## 2024-02-09 RX ORDER — NITROGLYCERIN 0.4 MG/1
0.4 TABLET SUBLINGUAL EVERY 5 MIN PRN
Status: DISCONTINUED | OUTPATIENT
Start: 2024-02-09 | End: 2024-02-09 | Stop reason: SDUPTHER

## 2024-02-09 RX ORDER — CLOPIDOGREL BISULFATE 75 MG/1
75 TABLET ORAL DAILY
Status: DISCONTINUED | OUTPATIENT
Start: 2024-02-10 | End: 2024-02-10 | Stop reason: HOSPADM

## 2024-02-09 RX ORDER — ASPIRIN 325 MG
325 TABLET ORAL ONCE
Status: DISCONTINUED | OUTPATIENT
Start: 2024-02-09 | End: 2024-02-09

## 2024-02-09 RX ORDER — ALUMINUM HYDROXIDE, MAGNESIUM HYDROXIDE, AND SIMETHICONE 2400; 240; 2400 MG/30ML; MG/30ML; MG/30ML
30 SUSPENSION ORAL 4 TIMES DAILY PRN
Status: DISCONTINUED | OUTPATIENT
Start: 2024-02-09 | End: 2024-02-09

## 2024-02-09 RX ORDER — CLOPIDOGREL BISULFATE 75 MG/1
75 TABLET ORAL DAILY
Qty: 30 TABLET | Refills: 11 | Status: SHIPPED | OUTPATIENT
Start: 2024-02-10 | End: 2024-02-15 | Stop reason: SDUPTHER

## 2024-02-09 RX ORDER — LIDOCAINE HYDROCHLORIDE 20 MG/ML
INJECTION, SOLUTION INFILTRATION; PERINEURAL AS NEEDED
Status: DISCONTINUED | OUTPATIENT
Start: 2024-02-09 | End: 2024-02-09 | Stop reason: HOSPADM

## 2024-02-09 RX ORDER — ALUMINUM HYDROXIDE, MAGNESIUM HYDROXIDE, AND SIMETHICONE 1200; 120; 1200 MG/30ML; MG/30ML; MG/30ML
30 SUSPENSION ORAL 4 TIMES DAILY PRN
Status: DISCONTINUED | OUTPATIENT
Start: 2024-02-09 | End: 2024-02-10 | Stop reason: HOSPADM

## 2024-02-09 RX ORDER — FENTANYL CITRATE 50 UG/ML
INJECTION, SOLUTION INTRAMUSCULAR; INTRAVENOUS AS NEEDED
Status: DISCONTINUED | OUTPATIENT
Start: 2024-02-09 | End: 2024-02-09 | Stop reason: HOSPADM

## 2024-02-09 RX ORDER — CLOPIDOGREL BISULFATE 300 MG/1
TABLET, FILM COATED ORAL AS NEEDED
Status: DISCONTINUED | OUTPATIENT
Start: 2024-02-09 | End: 2024-02-09 | Stop reason: HOSPADM

## 2024-02-09 RX ORDER — MIDAZOLAM HYDROCHLORIDE 1 MG/ML
INJECTION, SOLUTION INTRAMUSCULAR; INTRAVENOUS AS NEEDED
Status: DISCONTINUED | OUTPATIENT
Start: 2024-02-09 | End: 2024-02-09 | Stop reason: HOSPADM

## 2024-02-09 RX ORDER — HEPARIN SODIUM 1000 [USP'U]/ML
INJECTION, SOLUTION INTRAVENOUS; SUBCUTANEOUS AS NEEDED
Status: DISCONTINUED | OUTPATIENT
Start: 2024-02-09 | End: 2024-02-09 | Stop reason: HOSPADM

## 2024-02-09 RX ORDER — ASPIRIN 81 MG/1
81 TABLET ORAL DAILY
Status: DISCONTINUED | OUTPATIENT
Start: 2024-02-09 | End: 2024-02-09

## 2024-02-09 RX ORDER — SODIUM CHLORIDE 9 MG/ML
100 INJECTION, SOLUTION INTRAVENOUS CONTINUOUS
Status: DISCONTINUED | OUTPATIENT
Start: 2024-02-09 | End: 2024-02-09

## 2024-02-09 RX ORDER — ISOSORBIDE MONONITRATE 30 MG/1
30 TABLET, EXTENDED RELEASE ORAL DAILY
Status: DISCONTINUED | OUTPATIENT
Start: 2024-02-09 | End: 2024-02-10 | Stop reason: HOSPADM

## 2024-02-09 RX ADMIN — SODIUM CHLORIDE 100 ML/HR: 9 INJECTION, SOLUTION INTRAVENOUS at 11:57

## 2024-02-09 RX ADMIN — SODIUM CHLORIDE 100 ML/HR: 9 INJECTION, SOLUTION INTRAVENOUS at 15:00

## 2024-02-09 SDOH — SOCIAL STABILITY: SOCIAL INSECURITY: HAS ANYONE EVER THREATENED TO HURT YOUR FAMILY OR YOUR PETS?: NO

## 2024-02-09 SDOH — SOCIAL STABILITY: SOCIAL INSECURITY: HAVE YOU HAD THOUGHTS OF HARMING ANYONE ELSE?: NO

## 2024-02-09 SDOH — SOCIAL STABILITY: SOCIAL INSECURITY: DO YOU FEEL UNSAFE GOING BACK TO THE PLACE WHERE YOU ARE LIVING?: NO

## 2024-02-09 SDOH — SOCIAL STABILITY: SOCIAL INSECURITY: DOES ANYONE TRY TO KEEP YOU FROM HAVING/CONTACTING OTHER FRIENDS OR DOING THINGS OUTSIDE YOUR HOME?: NO

## 2024-02-09 SDOH — SOCIAL STABILITY: SOCIAL INSECURITY: ARE YOU OR HAVE YOU BEEN THREATENED OR ABUSED PHYSICALLY, EMOTIONALLY, OR SEXUALLY BY ANYONE?: NO

## 2024-02-09 SDOH — SOCIAL STABILITY: SOCIAL INSECURITY: ABUSE: ADULT

## 2024-02-09 SDOH — SOCIAL STABILITY: SOCIAL INSECURITY: WERE YOU ABLE TO COMPLETE ALL THE BEHAVIORAL HEALTH SCREENINGS?: YES

## 2024-02-09 SDOH — SOCIAL STABILITY: SOCIAL INSECURITY: DO YOU FEEL ANYONE HAS EXPLOITED OR TAKEN ADVANTAGE OF YOU FINANCIALLY OR OF YOUR PERSONAL PROPERTY?: NO

## 2024-02-09 SDOH — SOCIAL STABILITY: SOCIAL INSECURITY: ARE THERE ANY APPARENT SIGNS OF INJURIES/BEHAVIORS THAT COULD BE RELATED TO ABUSE/NEGLECT?: NO

## 2024-02-09 ASSESSMENT — COGNITIVE AND FUNCTIONAL STATUS - GENERAL
MOBILITY SCORE: 24
DAILY ACTIVITIY SCORE: 24
PATIENT BASELINE BEDBOUND: NO
DAILY ACTIVITIY SCORE: 24

## 2024-02-09 ASSESSMENT — COLUMBIA-SUICIDE SEVERITY RATING SCALE - C-SSRS
6. HAVE YOU EVER DONE ANYTHING, STARTED TO DO ANYTHING, OR PREPARED TO DO ANYTHING TO END YOUR LIFE?: NO
1. IN THE PAST MONTH, HAVE YOU WISHED YOU WERE DEAD OR WISHED YOU COULD GO TO SLEEP AND NOT WAKE UP?: NO
2. HAVE YOU ACTUALLY HAD ANY THOUGHTS OF KILLING YOURSELF?: NO

## 2024-02-09 ASSESSMENT — ACTIVITIES OF DAILY LIVING (ADL)
HEARING - RIGHT EAR: FUNCTIONAL
LACK_OF_TRANSPORTATION: NO
PATIENT'S MEMORY ADEQUATE TO SAFELY COMPLETE DAILY ACTIVITIES?: YES
HEARING - LEFT EAR: FUNCTIONAL
WALKS IN HOME: INDEPENDENT
JUDGMENT_ADEQUATE_SAFELY_COMPLETE_DAILY_ACTIVITIES: YES
TOILETING: INDEPENDENT
GROOMING: INDEPENDENT
BATHING: INDEPENDENT
ADEQUATE_TO_COMPLETE_ADL: YES
DRESSING YOURSELF: INDEPENDENT
FEEDING YOURSELF: INDEPENDENT

## 2024-02-09 ASSESSMENT — LIFESTYLE VARIABLES
PRESCIPTION_ABUSE_PAST_12_MONTHS: NO
HOW MANY STANDARD DRINKS CONTAINING ALCOHOL DO YOU HAVE ON A TYPICAL DAY: PATIENT DOES NOT DRINK
SKIP TO QUESTIONS 9-10: 1
AUDIT-C TOTAL SCORE: 0
HOW OFTEN DO YOU HAVE 6 OR MORE DRINKS ON ONE OCCASION: NEVER
AUDIT-C TOTAL SCORE: 0
HOW OFTEN DO YOU HAVE A DRINK CONTAINING ALCOHOL: NEVER
SUBSTANCE_ABUSE_PAST_12_MONTHS: NO

## 2024-02-09 ASSESSMENT — PAIN SCALES - GENERAL
PAINLEVEL_OUTOF10: 0 - NO PAIN
PAINLEVEL_OUTOF10: 0 - NO PAIN

## 2024-02-09 ASSESSMENT — PATIENT HEALTH QUESTIONNAIRE - PHQ9
1. LITTLE INTEREST OR PLEASURE IN DOING THINGS: NOT AT ALL
2. FEELING DOWN, DEPRESSED OR HOPELESS: NOT AT ALL
SUM OF ALL RESPONSES TO PHQ9 QUESTIONS 1 & 2: 0

## 2024-02-09 ASSESSMENT — PAIN - FUNCTIONAL ASSESSMENT
PAIN_FUNCTIONAL_ASSESSMENT: 0-10
PAIN_FUNCTIONAL_ASSESSMENT: 0-10

## 2024-02-09 NOTE — Clinical Note
Universal procedure checklist and airway assessment completed. Helical Rim Advancement Flap Text: The defect edges were debeveled with a #15 blade scalpel.  Given the location of the defect and the proximity to free margins (helical rim) a double helical rim advancement flap was deemed most appropriate.  Using a sterile surgical marker, the appropriate advancement flaps were drawn incorporating the defect and placing the expected incisions between the helical rim and antihelix where possible.  The area thus outlined was incised through and through with a #15 scalpel blade.  With a skin hook and iris scissors, the flaps were gently and sharply undermined and freed up.

## 2024-02-09 NOTE — Clinical Note
Angioplasty of the middle right coronary artery lesion. Inflation 1: Pressure = 15 félix; Duration = 30 sec. Inflation 2: Pressure = 15 félix; Duration = 15 sec.

## 2024-02-09 NOTE — NURSING NOTE
"Patient rt groin site remains stable, states he has to have a BM, possibly \"diarrhea\" Assisted to use bedpan while keeping rt leg straight and patient instructed not to strain.   "

## 2024-02-09 NOTE — INTERVAL H&P NOTE
H&P reviewed. The patient was examined and there are no changes to the H&P.  Laboratory data reviewed, there is slight decrease in renal function with GFR in the 40s, will hydrate pre and postprocedure.

## 2024-02-09 NOTE — NURSING NOTE
Patient resting comfortably, remains on strict bedrest with rt leg straight for 4 hours ([id number]). IV fluids infusing at 100ml/hr x 6 hours, will be completed at 2045. Patient has used the bedpan x 2 for loose stool, states it's the first time he has had a BM in 3-4 days, ate small amount of box lunch, states he does not eat much. Discharge instructions sent to patient including procedure, site care, Plavix and going home on blood thinners as well as handout about Cardiac Rehab program. Patient had no transportation here to hospital, had [facility] provide his ride here and they are unable to  patients after 1900, therefore plan is to admit patient as extended recovery overnight. Will call report to 8S.

## 2024-02-09 NOTE — NURSING NOTE
Report called to PAR[facility] Lloyd on 8S. Aware that patient is S/P PCI with KRISTI to RCA via rt femoral approach. Rt groin site has remained stable, patient on bedrest until 1845 and iV fluids infusing until 2045. Patient unable to go home this evening due to transportation issues and will have to stay overnight.Has stent card and discharge instructions regarding site care, procedure and Plavix in his yellow folder, as well as information about cardiac rehab. Patient states he has attended Cardiac Rehab before as this is his fourth stent. Will transfer via cart once transport team arrives.

## 2024-02-09 NOTE — PROGRESS NOTES
Reviewed findings of cardiac catheterization/PCI of mid to distal right coronary artery with patient.  Reviewed postprocedure activity restrictions, need for medication compliance including dual antiplatelet therapy with him.  Follow-up with Dr. Juarez has been arranged and discharge medications have been reconciled.  Patient will be monitored overnight due to procedure being performed in the afternoon and need for monitoring for routine postprocedure care.  He will need assistance from nursing staff to coordinate transportation upon discharge.

## 2024-02-09 NOTE — POST-PROCEDURE NOTE
Physician Transition of Care Summary  Invasive Cardiovascular Lab    Procedure Date: 2/9/2024  Attending:    Leo Juarez - Primary  Resident/Fellow/Other Assistant: Surgeon(s) and Role:    Indications:   Pre-op Diagnosis     * Shortness of breath [R06.02]     * Crescendo angina (CMS/HCC) [I20.0]     * Stented coronary artery [Z95.5]    Post-procedure diagnosis:   Post-op Diagnosis     * Shortness of breath [R06.02]     * Crescendo angina (CMS/HCC) [I20.0]     * Stented coronary artery [Z95.5]    Procedure(s):     * Left Heart Cath, With LV    * PCI PAUL Stent- Coronary  Moderate sedation  Selective right common femoral angiography        Procedure Findings:   Right dominant system, 75 to 80% distal mid RCA stenosis  Patent stent in the LAD, nonflow-limiting disease LAD, LVEF 55% LVEDP 10 to 15 mmHg no systolic gradient across the aortic valve  Description of the Procedure:   Patient underwent left heart catheterization treatment of left ventricular end-diastolic pressure left ventriculography LV AO pullback selective coronary angiography PCI and stenting of the mid right coronary artery selective right common femoral angiography and Angio-Seal deployment.    Complications:   None    Stents/Implants:   Cardiovascular Implants       Stent    Stent, Gissel [place] Paul, 3.00 X 15rx - [id number] - Implanted        Inventory item: STENT, GISSEL FRONTIER PAUL, 3.00 X 15RX Model/Cat number: [id number]    : [employer] Lot number: [id number]    Device identifier: [id number]        As of 2/9/2024       Status: Implanted                              Anticoagulation/Antiplatelet Plan:   Patient was loaded with 600 mg of clopidogrel, patient will continue 75 mg of Clopidogrel and ASA 81mg daily.    Estimated Blood Loss:   10 mL    Anesthesia: Moderate Sedation Anesthesia Staff: No anesthesia staff entered.    Any Specimen(s) Removed:   No specimens collected during this procedure.    Disposition:   Given  patient's age, and lack of availability of ride, timing of procedure, and CKD, it is in patient's best interest to keep him overnight hydrate and discharge in the morning.  Follow-up with Dr. Juarez in 1 week.      Electronically signed by: Ariana Juarez MD, 2/9/2024 2:47 PM

## 2024-02-09 NOTE — NURSING NOTE
Transported via cart to 8S with transporter, IV pump and tele monitor. Rt groin site remains stable, pedal pulses palpable. Patient cautioned to call for help first time out of bed.

## 2024-02-09 NOTE — NURSING NOTE
Bedside handoff report received from off-going RNAnita at 1515. Patient is S/P PCI with KRISTI to RCA via rt femoral site performed by Dr. Juarez. Patient arrived to room 233 at 1445 and to remain flat until 1545. Rt femoral site with biocclusive dressing, dry/intact, no hematoma, no ecchymosis. Pedal pulses palpable. Patient instructed not to bend or flex rt leg. Denies any complaints of pain at this time.

## 2024-02-10 ENCOUNTER — APPOINTMENT (OUTPATIENT)
Dept: CARDIOLOGY | Facility: HOSPITAL | Age: 84
End: 2024-02-10
Payer: MEDICARE

## 2024-02-10 VITALS
SYSTOLIC BLOOD PRESSURE: 132 MMHG | DIASTOLIC BLOOD PRESSURE: 71 MMHG | WEIGHT: 189.6 LBS | RESPIRATION RATE: 20 BRPM | OXYGEN SATURATION: 93 % | TEMPERATURE: 98.6 F | HEART RATE: 59 BPM | HEIGHT: 69 IN | BODY MASS INDEX: 28.08 KG/M2

## 2024-02-10 LAB
ANION GAP SERPL CALC-SCNC: 13 MMOL/L (ref 10–20)
BUN SERPL-MCNC: 16 MG/DL (ref 6–23)
CALCIUM SERPL-MCNC: 8.8 MG/DL (ref 8.6–10.3)
CHLORIDE SERPL-SCNC: 105 MMOL/L (ref 98–107)
CO2 SERPL-SCNC: 24 MMOL/L (ref 21–32)
CREAT SERPL-MCNC: 1.44 MG/DL (ref 0.5–1.3)
EGFRCR SERPLBLD CKD-EPI 2021: 48 ML/MIN/1.73M*2
GLUCOSE SERPL-MCNC: 96 MG/DL (ref 74–99)
POTASSIUM SERPL-SCNC: 4.1 MMOL/L (ref 3.5–5.3)
SODIUM SERPL-SCNC: 138 MMOL/L (ref 136–145)

## 2024-02-10 PROCEDURE — 36415 COLL VENOUS BLD VENIPUNCTURE: CPT | Performed by: NURSE PRACTITIONER

## 2024-02-10 PROCEDURE — 99239 HOSP IP/OBS DSCHRG MGMT >30: CPT | Performed by: INTERNAL MEDICINE

## 2024-02-10 PROCEDURE — 80048 BASIC METABOLIC PNL TOTAL CA: CPT | Performed by: NURSE PRACTITIONER

## 2024-02-10 PROCEDURE — 7100000011 HC EXTENDED STAY RECOVERY HOURLY - NURSING UNIT

## 2024-02-10 PROCEDURE — 93005 ELECTROCARDIOGRAM TRACING: CPT

## 2024-02-10 PROCEDURE — 2500000001 HC RX 250 WO HCPCS SELF ADMINISTERED DRUGS (ALT 637 FOR MEDICARE OP): Performed by: NURSE PRACTITIONER

## 2024-02-10 PROCEDURE — 93010 ELECTROCARDIOGRAM REPORT: CPT | Performed by: INTERNAL MEDICINE

## 2024-02-10 RX ADMIN — ATORVASTATIN CALCIUM 40 MG: 20 TABLET, FILM COATED ORAL at 08:50

## 2024-02-10 RX ADMIN — ASPIRIN 81 MG: 81 TABLET, CHEWABLE ORAL at 08:51

## 2024-02-10 RX ADMIN — CLOPIDOGREL BISULFATE 75 MG: 75 TABLET, FILM COATED ORAL at 08:52

## 2024-02-10 RX ADMIN — ISOSORBIDE MONONITRATE 30 MG: 30 TABLET, EXTENDED RELEASE ORAL at 08:51

## 2024-02-10 RX ADMIN — METOPROLOL SUCCINATE 25 MG: 25 TABLET, EXTENDED RELEASE ORAL at 08:52

## 2024-02-10 NOTE — CARE PLAN
The patient's goals for the shift include get some sleep    The clinical goals for the shift include Patient will remain hemodynamically stable throughout shift.

## 2024-02-10 NOTE — DISCHARGE SUMMARY
"Subjective Data:  Patient is doing well.  Status post PCI of the right coronary artery yesterday by Dr. Juarez.  Telemetry shows sinus rhythm      Overnight Events:    No events overnight     Objective Data:  Last Recorded Vitals:  Vitals:    02/09/24 1944 02/09/24 2008 02/09/24 2346 02/10/24 0748   BP: 158/83  133/77 132/71   BP Location: Right arm  Left arm Left arm   Patient Position: Lying  Lying Lying   Pulse: 57  58 59   Resp: 16  17 20   Temp: 36.3 °C (97.3 °F)  35.9 °C (96.6 °F) 37 °C (98.6 °F)   TempSrc: Temporal  Temporal Temporal   SpO2: 97%  93% 93%   Weight:  86 kg (189 lb 9.5 oz)     Height:  1.753 m (5' 9.02\")         Last Labs:  CBC - 2/7/2024:  1:35 PM  8.0 15.6 162    47.2      CMP - 2/10/2024:  6:02 AM  8.8 _ _ --- _   _ _ _ _      PTT - No results in last year.  1.1   11.9 _     No results found for: \"TROPHS\", \"BNP\", \"HGBA1C\", \"LDLCALC\", \"VLDL\"   Last I/O:  I/O last 3 completed shifts:  In: 530 (6.2 mL/kg) [I.V.:530 (6.2 mL/kg)]  Out: 0 (0 mL/kg)   Weight: 86 kg     Past Cardiology Tests (Last 3 Years):  EKG:  ECG 12 lead 02/10/2024 (Preliminary)      Electrocardiogram 12 Lead  (Preliminary)      ECG 12 lead (Clinic Performed) 02/07/2024    Echo:  No results found for this or any previous visit from the past 1095 days.    Ejection Fractions:  No results found for: \"EF\"  Cath:  Cardiac catheterization - coronary 02/09/2024    Stress Test:  No results found for this or any previous visit from the past 1095 days.    Cardiac Imaging:  No results found for this or any previous visit from the past 1095 days.      Inpatient Medications:  Scheduled medications   Medication Dose Route Frequency    aspirin  81 mg oral Daily    atorvastatin  40 mg oral Daily    clopidogrel  75 mg oral Daily    isosorbide mononitrate ER  30 mg oral Daily    metoprolol succinate XL  25 mg oral Daily     PRN medications   Medication    acetaminophen    alum-mag hydroxide-simeth    morphine    nitroglycerin     Continuous " Medications   Medication Dose Last Rate       Physical Exam:  [facility]    PHYSICAL EXAMINATION:  GENERAL APPEARANCE: Well developed, well nourished, in no acute distress.  CHEST: Symmetric and non-tender.  INTEGUMENT: Skin warm and dry, without gross excoriationis or lesions.  HEENT: No gross abnormalities of conjunctiva, teeth, gums, oral mucosa  NECK: Supple, no JVD, no bruit. Thyroid not palpable. Carotid upstrokes normal.  NEURO/PSHCY: Alert and oriented x3; appropriate behavior and responses and responses, grossly normal cerebellar function with normal balance and coordination  LUNGS: Clear to auscultation bilaterally; normal respiratory effort.  HEART: Rate and rhythm regular with no evident murmur; no gallop appreciated. There are no rubs, clicks or heaves. PMI nondisplaced.  ABDOMEN: Soft, nontender, no palpable hepatosplenomegaly, no mases, no bruits. Abdominal aorta not noted to be enlarged.  MUSCULOSKELETAL: Ambulatory with normal tandem gait.  EXTREMITIES: Warm with good color, no clubbing or cyanois. There is no edema noted.  PERIPHERAL VASCULAR: Pulses present and equally palpable; 2+ throughout. No femoral bruits.       Assessment/Plan   Coronary artery disease  Status post PCI of the right coronary artery performed during this admission  Peripheral IV 02/09/24 20 G Left;Dorsal Hand (Active)   Site Assessment Clean;Dry;Intact 02/10/24 0849   Dressing Type Transparent 02/10/24 0849   Line Status No blood return;Flushed 02/10/24 0849   Dressing Status Clean;Dry 02/10/24 0849   Number of days: 1       Code Status:  Full Code    I spent 40 minutes in the professional and overall care of this patient.        Mendez Sarkar MD

## 2024-02-10 NOTE — PROGRESS NOTES
"  [facility] Progress Note               Rounding Cardiologist:  Mendez Sarkar MD, MD   Primary Cardiologist: Dr. Ariana Juarez    Date:  2/10/2024  Patient:  Fish Wallis  YOB: 1940  MRN:  [id number]   Admit Date:  2/9/2024      SUBJECTIVE    Fish Wallsi was seen and examined today at bedside.     Patient is doing well  Status post PCI of the right coronary artery.  No chest pain or shortness of breath  Telemetry shows sinus rhythm      VITALS     Vitals:    02/09/24 1944 02/09/24 2008 02/09/24 2346 02/10/24 0748   BP: 158/83  133/77 132/71   BP Location: Right arm  Left arm Left arm   Patient Position: Lying  Lying Lying   Pulse: 57  58 59   Resp: 16  17 20   Temp: 36.3 °C (97.3 °F)  35.9 °C (96.6 °F) 37 °C (98.6 °F)   TempSrc: Temporal  Temporal Temporal   SpO2: 97%  93% 93%   Weight:  86 kg (189 lb 9.5 oz)     Height:  1.753 m (5' 9.02\")         Intake/Output Summary (Last 24 hours) at 2/10/2024 1054  Last data filed at 2/9/2024 2018  Gross per 24 hour   Intake 530 ml   Output 0 ml   Net 530 ml       [unfilled]    PHYSICAL EXAM   PHYSICAL EXAMINATION:  GENERAL:  Well developed, well nourished, in no acute distress.  CHEST:  Symmetric and nontender.  NEURO/PSYCH:  Alert and oriented times three with approppriate behavior and responses.  NECK:  Supple, no JVD, no bruit.  LUNGS:  Clear to auscultation bilaterally, normal respiratory effort.  HEART:  Rate and rhythm regular with no evident murmur, no gallop appreciated.        There are no rubs, clicks or heaves.  EXTREMITIES:  Warm with good color, no clubbing or cyanosis.  There is no edema noted.  PERIPHERAL VASCULAR:  Pulses present and equally palpable; 2+ throughout.      DIAGNOSTIC RESULTS   EKG:     Telemetry: Sinus rhythm.      LAB DATA   BMP:  @LABRCNT(Na:3,K:3,CL:3,CO2:3,Bun:3,Creatinine:3,Glu:3, CA:3,LABGLOM)@    Cardiac Enzymes:  @LABRCNT(CKTOTAL:3,CKMB:3,CKMBINDEX:3,TROPONINI:3)@    CBC:   No results found for: \"WBC\", \"RBC\", \"HGB\", \"HCT\", " "\"PLT\"    CMP:    Lab Results   Component Value Date     02/10/2024    K 4.1 02/10/2024     02/10/2024    CO2 24 02/10/2024    BUN 16 02/10/2024    CREATININE 1.44 (H) 02/10/2024    GLUCOSE 96 02/10/2024    CALCIUM 8.8 02/10/2024       Hepatic Function Panel:  No results found for: \"ALKPHOS\", \"ALT\", \"AST\", \"PROT\", \"BILITOT\", \"BILIDIR\"    Magnesium:  No results found for: \"MG\"    PT/INR:  No results found for: \"PROTIME\", \"INR\"  @LABRCNT(inr:3)@     HgBA1c:  No components found for: \"LABA1C\"    Lipid Profile:  No results found for: \"CHLPL\", \"TRIG\", \"HDL\", \"LDLCALC\", \"LDLDIRECT\"    TSH:  No results found for: \"TSH\"    ABG:  No results found for: \"PH\"    PRO-BNP: No results found for: \"PROBNP\"       RADIOLOGY     Cardiac catheterization - coronary   Final Result          [unfilled]      CURRENT MEDICATIONS    aspirin, 81 mg, oral, Daily  atorvastatin, 40 mg, oral, Daily  clopidogrel, 75 mg, oral, Daily  isosorbide mononitrate ER, 30 mg, oral, Daily  metoprolol succinate XL, 25 mg, oral, Daily             ASSESSMENT   Principal Problem:    Shortness of breath  Active Problems:    CAD S/P percutaneous coronary angioplasty    Crescendo angina (CMS/HCC)    Stented coronary artery        Patient Active Problem List   Diagnosis    Shortness of breath    Crescendo angina (CMS/HCC)    Stented coronary artery    CAD S/P percutaneous coronary angioplasty         PLAN     Status post PCI of the right coronary artery yesterday by Dr. Juarez.  Patient is doing well  Continue with current medical therapy  Continue with dual antiplatelet therapy  Follow-up with Dr. Juarez in the next 2 to 4 weeks.    Mendez Sarkar MD      Please do not hesitate to call with questions.  Electronically signed by Mendez Sarkar MD, [facility] on 2/10/2024 at 10:54 AM    "

## 2024-02-11 LAB
ATRIAL RATE: 56 BPM
ATRIAL RATE: 60 BPM
P AXIS: 77 DEGREES
P AXIS: 78 DEGREES
P OFFSET: 178 MS
P OFFSET: 189 MS
P ONSET: 131 MS
P ONSET: 131 MS
PR INTERVAL: 174 MS
PR INTERVAL: 184 MS
Q ONSET: 218 MS
Q ONSET: 223 MS
QRS COUNT: 10 BEATS
QRS COUNT: 9 BEATS
QRS DURATION: 76 MS
QRS DURATION: 80 MS
QT INTERVAL: 428 MS
QT INTERVAL: 456 MS
QTC CALCULATION(BAZETT): 428 MS
QTC CALCULATION(BAZETT): 440 MS
QTC FREDERICIA: 428 MS
QTC FREDERICIA: 446 MS
R AXIS: 69 DEGREES
R AXIS: 70 DEGREES
T AXIS: 69 DEGREES
T AXIS: 78 DEGREES
T OFFSET: 432 MS
T OFFSET: 451 MS
VENTRICULAR RATE: 56 BPM
VENTRICULAR RATE: 60 BPM

## 2024-02-13 ENCOUNTER — ANCILLARY PROCEDURE (OUTPATIENT)
Dept: CARDIOLOGY | Facility: CLINIC | Age: 84
End: 2024-02-13
Payer: MEDICARE

## 2024-02-13 DIAGNOSIS — R06.02 SHORTNESS OF BREATH: ICD-10-CM

## 2024-02-13 PROCEDURE — 93306 TTE W/DOPPLER COMPLETE: CPT

## 2024-02-13 PROCEDURE — 93306 TTE W/DOPPLER COMPLETE: CPT | Performed by: INTERNAL MEDICINE

## 2024-02-15 ENCOUNTER — OFFICE VISIT (OUTPATIENT)
Dept: CARDIOLOGY | Facility: CLINIC | Age: 84
End: 2024-02-15
Payer: MEDICARE

## 2024-02-15 VITALS
WEIGHT: 191 LBS | DIASTOLIC BLOOD PRESSURE: 70 MMHG | BODY MASS INDEX: 28.29 KG/M2 | HEART RATE: 62 BPM | HEIGHT: 69 IN | SYSTOLIC BLOOD PRESSURE: 130 MMHG

## 2024-02-15 DIAGNOSIS — Z95.5 STENTED CORONARY ARTERY: ICD-10-CM

## 2024-02-15 DIAGNOSIS — Z98.61 CAD S/P PERCUTANEOUS CORONARY ANGIOPLASTY: ICD-10-CM

## 2024-02-15 DIAGNOSIS — R06.02 SHORTNESS OF BREATH: ICD-10-CM

## 2024-02-15 DIAGNOSIS — I25.10 CAD S/P PERCUTANEOUS CORONARY ANGIOPLASTY: ICD-10-CM

## 2024-02-15 PROBLEM — N18.31 STAGE 3A CHRONIC KIDNEY DISEASE (MULTI): Status: ACTIVE | Noted: 2024-02-15

## 2024-02-15 PROCEDURE — 1160F RVW MEDS BY RX/DR IN RCRD: CPT | Performed by: INTERNAL MEDICINE

## 2024-02-15 PROCEDURE — 1036F TOBACCO NON-USER: CPT | Performed by: INTERNAL MEDICINE

## 2024-02-15 PROCEDURE — 1126F AMNT PAIN NOTED NONE PRSNT: CPT | Performed by: INTERNAL MEDICINE

## 2024-02-15 PROCEDURE — 99214 OFFICE O/P EST MOD 30 MIN: CPT | Performed by: INTERNAL MEDICINE

## 2024-02-15 PROCEDURE — 1159F MED LIST DOCD IN RCRD: CPT | Performed by: INTERNAL MEDICINE

## 2024-02-15 RX ORDER — ISOSORBIDE MONONITRATE 30 MG/1
30 TABLET, EXTENDED RELEASE ORAL DAILY
Qty: 100 TABLET | Refills: 1 | Status: SHIPPED | OUTPATIENT
Start: 2024-02-15 | End: 2024-09-02

## 2024-02-15 RX ORDER — CLOPIDOGREL BISULFATE 75 MG/1
75 TABLET ORAL DAILY
Qty: 90 TABLET | Refills: 1 | Status: SHIPPED | OUTPATIENT
Start: 2024-02-15

## 2024-02-15 RX ORDER — OMEPRAZOLE 40 MG/1
1 CAPSULE, DELAYED RELEASE ORAL DAILY
COMMUNITY
Start: 2015-12-17 | End: 2024-02-15

## 2024-02-15 RX ORDER — ATORVASTATIN CALCIUM 40 MG/1
40 TABLET, FILM COATED ORAL DAILY
Qty: 100 TABLET | Refills: 1 | Status: SHIPPED | OUTPATIENT
Start: 2024-02-15 | End: 2024-09-02

## 2024-02-15 RX ORDER — METOPROLOL SUCCINATE 25 MG/1
25 TABLET, EXTENDED RELEASE ORAL DAILY
Qty: 100 TABLET | Refills: 1 | Status: SHIPPED | OUTPATIENT
Start: 2024-02-15 | End: 2024-09-02

## 2024-02-15 RX ORDER — PRAVASTATIN SODIUM 40 MG/1
40 TABLET ORAL DAILY
COMMUNITY
End: 2024-02-15

## 2024-02-15 NOTE — PROGRESS NOTES
"Patient was most recently seen 2/7/2024.  Went on to have coronary angiography PCI and stenting of the RCA.  Presents for follow-up.  Also had echocardiography.    Subjective :     Interval review of systems is negative for chest discomfort pressure tightness heaviness palpitations lightheadedness orthopnea paroxysmal nocturnal dyspnea dependent edema or claudication TIA or CVA type symptoms or bleeding diathesis  Brought in medication bottles  Occasional chest discomfort after heavy meals.  Trying to cut down potion size      History so Far :  1. Atherosclerotic native vessel coronary artery disease with  percutaneous coronary intervention and stenting of the distal AV  groove left circumflex artery with a 2.75 x 13 mm Cypher drug-eluting  stent in the setting of acute coronary syndrome in 2008.  2. Balloon angioplasty of subtotally occluded circumflex in January 2011 at which time, stenting was not performed.  3. Dyslipidemia.  4. Refuses immunizations  5. Preserved LV systolic function.  6. Body mass index is slightly elevated and puts him in the overweight category, but he follows a very active heart healthy lifestyle and no major dietary modification is warranted  7. The patient is staying active.  8. The patient has consistently refused Prevnar despite  understanding its value.  9'. CKD stage 3,GFR 55 10/18. No more recent lab data available and patient refuses to get blood work  10. Hard of hearing    Objective   Failed to redirect to the Timeline version of the [employer] SmartLink.   Wt Readings from Last 3 Encounters:   02/15/24 86.6 kg (191 lb)   02/09/24 86 kg (189 lb 9.5 oz)   02/07/24 85.7 kg (189 lb)        Visit Vitals  /70 (BP Location: Left arm, Patient Position: Sitting)   Pulse 62   Ht 1.753 m (5' 9\")   Wt 86.6 kg (191 lb)   BMI 28.21 kg/m²   Smoking Status Former   BSA 2.05 m²            Physical Exam:    GENERAL APPEARANCE: in no acute distress.  CHEST: Symmetric and non-tender.  INTEGUMENT: " Skin warm and dry  HEENT: No gross abnormalities identified.No pallor or scleral icterus.  NECK: Supple, no JVD, no bruit.   NEURO/PSHCY: Alert and oriented x3; appropriate behavior and responses and responses  LUNGS: Clear to auscultation bilaterally; normal respiratory effort.  HEART: Rate and rhythm regular with no evident murmur; no gallop appreciated.   ABDOMEN: Soft, non tender.  MUSCULOSKELETAL: No gross deformities.  EXTREMITIES: Warm  There is no edema noted.  Right groin soft and nontender    Meds:  Current Outpatient Medications   Medication Instructions    aspirin 81 mg EC tablet 1 tablet, oral, Daily    atorvastatin (LIPITOR) 40 mg, oral, Daily    clopidogrel (PLAVIX) 75 mg, oral, Daily    isosorbide mononitrate ER (IMDUR) 30 mg, oral, Daily, Do not crush or chew.    metoprolol succinate XL (TOPROL-XL) 25 mg, oral, Daily    nitroglycerin (Nitrostat) 0.4 mg SL tablet sublingual, Every 5 min PRN      No Known Allergies    LABS:    Lab Results   Component Value Date    WBC 8.0 02/07/2024    HGB 15.6 02/07/2024    HCT 47.2 02/07/2024     02/07/2024     02/10/2024    K 4.1 02/10/2024     02/10/2024    CREATININE 1.44 (H) 02/10/2024    BUN 16 02/10/2024    CO2 24 02/10/2024    INR 1.1 02/07/2024                 Creatinine 1.32 GFR 53.2 January 21, 2024  Creatinine 1.44 GFR 48 February 10 2024    Patient Active Problem List    Diagnosis Date Noted    CAD S/P percutaneous coronary angioplasty 02/09/2024    Shortness of breath 02/07/2024    Crescendo angina (CMS/HCC) 02/07/2024    Stented coronary artery 02/07/2024                 Assessment:    1. CAD S/P percutaneous coronary angioplasty  Follow Up In Cardiology    Follow Up In Cardiology      2. Stented coronary artery  isosorbide mononitrate ER (Imdur) 30 mg 24 hr tablet    clopidogrel (Plavix) 75 mg tablet    atorvastatin (Lipitor) 40 mg tablet    Follow Up In Cardiology    Follow Up In Cardiology      3. Crescendo angina (CMS/HCC)   metoprolol succinate XL (Toprol-XL) 25 mg 24 hr tablet    isosorbide mononitrate ER (Imdur) 30 mg 24 hr tablet    Follow Up In Cardiology    Follow Up In Cardiology      4. Shortness of breath  metoprolol succinate XL (Toprol-XL) 25 mg 24 hr tablet    Follow Up In Cardiology    Follow Up In Cardiology         Patient seems to be doing well after recent PCI and stenting.  Reiterated the importance of dual antiplatelet therapy  Follow up : 6 months Ronda Bustamante, 1 year with me.        Provider Attestation - Scribe documentation    All medical record entries made by the Scribe were at my direction and personally dictated by me. I have reviewed the chart and agree that the record accurately reflects my personal performance of the history, physical exam, discussion and plan.

## 2024-02-16 LAB
AORTIC VALVE MEAN GRADIENT: 2 MMHG
AORTIC VALVE PEAK VELOCITY: 0.9 M/S
AV PEAK GRADIENT: 3.2 MMHG
AVA (PEAK VEL): 4.56 CM2
AVA (VTI): 5.21 CM2
EJECTION FRACTION APICAL 4 CHAMBER: 61.4
EJECTION FRACTION: 56 %
LEFT VENTRICLE INTERNAL DIMENSION DIASTOLE: 3.76 CM (ref 3.5–6)
LEFT VENTRICULAR OUTFLOW TRACT DIAMETER: 2.4 CM
MITRAL VALVE E/A RATIO: 0.61
MITRAL VALVE E/E' RATIO: 14.67

## 2024-02-23 LAB — ACT BLD: 300 SEC (ref 89–169)

## 2024-03-02 ENCOUNTER — APPOINTMENT (OUTPATIENT)
Dept: GENERAL RADIOLOGY | Age: 84
End: 2024-03-02
Payer: MEDICARE

## 2024-03-02 ENCOUNTER — HOSPITAL ENCOUNTER (EMERGENCY)
Age: 84
Discharge: HOME | End: 2024-03-02
Payer: MEDICARE

## 2024-03-02 VITALS
SYSTOLIC BLOOD PRESSURE: 136 MMHG | HEIGHT: 72 IN | HEART RATE: 59 BPM | TEMPERATURE: 97.6 F | RESPIRATION RATE: 16 BRPM | DIASTOLIC BLOOD PRESSURE: 82 MMHG | OXYGEN SATURATION: 100 % | BODY MASS INDEX: 25.06 KG/M2 | WEIGHT: 185 LBS

## 2024-03-02 DIAGNOSIS — R06.02 SHORTNESS OF BREATH: Primary | ICD-10-CM

## 2024-03-02 LAB
ALBUMIN SERPL-MCNC: 4.5 G/DL (ref 3.5–4.6)
ALP SERPL-CCNC: 91 U/L (ref 35–104)
ALT SERPL-CCNC: 7 U/L (ref 0–41)
ANION GAP SERPL CALCULATED.3IONS-SCNC: 9 MEQ/L (ref 9–15)
AST SERPL-CCNC: 13 U/L (ref 0–40)
BASOPHILS # BLD: 0.1 K/UL (ref 0–0.2)
BASOPHILS NFR BLD: 2 %
BILIRUB SERPL-MCNC: 0.3 MG/DL (ref 0.2–0.7)
BNP BLD-MCNC: 169 PG/ML
BUN SERPL-MCNC: 14 MG/DL (ref 8–23)
CALCIUM SERPL-MCNC: 9.3 MG/DL (ref 8.5–9.9)
CHLORIDE SERPL-SCNC: 98 MEQ/L (ref 95–107)
CO2 SERPL-SCNC: 28 MEQ/L (ref 20–31)
CREAT SERPL-MCNC: 1.25 MG/DL (ref 0.7–1.2)
EKG ATRIAL RATE: 61 BPM
EKG P AXIS: 77 DEGREES
EKG P-R INTERVAL: 174 MS
EKG Q-T INTERVAL: 434 MS
EKG QRS DURATION: 80 MS
EKG QTC CALCULATION (BAZETT): 436 MS
EKG R AXIS: 47 DEGREES
EKG T AXIS: 46 DEGREES
EKG VENTRICULAR RATE: 61 BPM
EOSINOPHIL # BLD: 0 K/UL (ref 0–0.7)
EOSINOPHIL NFR BLD: 0.5 %
ERYTHROCYTE [DISTWIDTH] IN BLOOD BY AUTOMATED COUNT: 13.5 % (ref 11.5–14.5)
GLOBULIN SER CALC-MCNC: 3.2 G/DL (ref 2.3–3.5)
GLUCOSE SERPL-MCNC: 106 MG/DL (ref 70–99)
HCT VFR BLD AUTO: 43.6 % (ref 42–52)
HGB BLD-MCNC: 14.4 G/DL (ref 14–18)
INFLUENZA A BY PCR: NEGATIVE
INFLUENZA B BY PCR: NEGATIVE
LYMPHOCYTES # BLD: 1.9 K/UL (ref 1–4.8)
LYMPHOCYTES NFR BLD: 31 %
MAGNESIUM SERPL-MCNC: 2.1 MG/DL (ref 1.7–2.4)
MCH RBC QN AUTO: 29.6 PG (ref 27–31.3)
MCHC RBC AUTO-ENTMCNC: 33 % (ref 33–37)
MCV RBC AUTO: 89.5 FL (ref 79–92.2)
MONOCYTES # BLD: 1.2 K/UL (ref 0.2–0.8)
MONOCYTES NFR BLD: 20 %
NEUTROPHILS # BLD: 2.9 K/UL (ref 1.4–6.5)
NEUTS SEG NFR BLD: 47 %
PLATELET # BLD AUTO: 183 K/UL (ref 130–400)
PLATELET BLD QL SMEAR: ADEQUATE
POTASSIUM SERPL-SCNC: 3.8 MEQ/L (ref 3.4–4.9)
PROT SERPL-MCNC: 7.7 G/DL (ref 6.3–8)
RBC # BLD AUTO: 4.87 M/UL (ref 4.7–6.1)
RBC MORPH BLD: NORMAL
SARS-COV-2 RDRP RESP QL NAA+PROBE: NOT DETECTED
SLIDE REVIEW: ABNORMAL
SODIUM SERPL-SCNC: 135 MEQ/L (ref 135–144)
TROPONIN, HIGH SENSITIVITY: 20 NG/L (ref 0–19)
TROPONIN, HIGH SENSITIVITY: 20 NG/L (ref 0–19)
WBC # BLD AUTO: 6.2 K/UL (ref 4.8–10.8)

## 2024-03-02 PROCEDURE — 71045 X-RAY EXAM CHEST 1 VIEW: CPT

## 2024-03-02 PROCEDURE — 85025 COMPLETE CBC W/AUTO DIFF WBC: CPT

## 2024-03-02 PROCEDURE — 80053 COMPREHEN METABOLIC PANEL: CPT

## 2024-03-02 PROCEDURE — 83880 ASSAY OF NATRIURETIC PEPTIDE: CPT

## 2024-03-02 PROCEDURE — 87635 SARS-COV-2 COVID-19 AMP PRB: CPT

## 2024-03-02 PROCEDURE — 83735 ASSAY OF MAGNESIUM: CPT

## 2024-03-02 PROCEDURE — 93005 ELECTROCARDIOGRAM TRACING: CPT | Performed by: EMERGENCY MEDICINE

## 2024-03-02 PROCEDURE — 36415 COLL VENOUS BLD VENIPUNCTURE: CPT

## 2024-03-02 PROCEDURE — 84484 ASSAY OF TROPONIN QUANT: CPT

## 2024-03-02 PROCEDURE — 99285 EMERGENCY DEPT VISIT HI MDM: CPT

## 2024-03-02 PROCEDURE — 87502 INFLUENZA DNA AMP PROBE: CPT

## 2024-03-02 ASSESSMENT — ENCOUNTER SYMPTOMS
COUGH: 0
SHORTNESS OF BREATH: 1
WHEEZING: 0
VOMITING: 0
DIARRHEA: 0
CHEST TIGHTNESS: 1

## 2024-03-02 ASSESSMENT — PAIN - FUNCTIONAL ASSESSMENT: PAIN_FUNCTIONAL_ASSESSMENT: NONE - DENIES PAIN

## 2024-03-02 NOTE — ED TRIAGE NOTES
Patient states has been seen in the ER several times for shortness of breath.   Patient states is fine during the day but when he lays down at night he feels short of breath.

## 2024-03-02 NOTE — ED NOTES
Patient alert and oriented at this time. Patient skin p,w,d. Patient respirations are even and unlabored at this time.     Patient ambulatory at this time.     Discharge instructions reviewed with patient. Patient verbalized understanding and states no questions at this time.

## 2024-03-02 NOTE — ED PROVIDER NOTES
[facility] ED  EMERGENCY DEPARTMENT ENCOUNTER      Pt Name: Pavan Manley  MRN: [id number]  Birthdate 1940  Date of evaluation: 3/2/2024  Provider: Apolinar Gordon PA-C  12:53 AM EST      CHIEF COMPLAINT       Chief Complaint   Patient presents with    Shortness of Breath         HISTORY OF PRESENT ILLNESS   (Location/Symptom, Timing/Onset, Context/Setting, Quality, Duration, Modifying Factors, Severity)  Note limiting factors.   Pavan Manley is a 83 y.o. male who presents to the emergency department with PMHx coronary artery disease and stent placement 2 weeks ago who presents emergency department with orthopnea and dyspnea.  Patient states that this has been an ongoing issue which she has been seen in the emergency department for in the past.  Patient states that he feels well during the daytime however in the evening time and when he goes to lay down he becomes short of breath and has difficulty catching his breath and taking a deep breath in.  Patient states that typically he is able to ambulate into cool air which helps to resolve the symptoms and he is able to sleep however tonight he had 2 separate episodes of dyspnea.  Patient did follow-up with his cardiologist after his last emergency department visit and was admitted to another facility and subsequently underwent heart catheterization and stent placement.  Patient only complains of chest pain when he is not able to get a deep breath and.  He denies any cough or congestion    HPI    Nursing Notes were reviewed.    REVIEW OF SYSTEMS    (2-9 systems for level 4, 10 or more for level 5)     Review of Systems   Constitutional:  Negative for diaphoresis and fever.   Respiratory:  Positive for chest tightness and shortness of breath. Negative for cough and wheezing.    Cardiovascular:  Positive for chest pain. Negative for palpitations.   Gastrointestinal:  Negative for diarrhea and vomiting.   All other systems reviewed and are negative.      Except as

## 2024-03-04 LAB
EKG ATRIAL RATE: 61 BPM
EKG P AXIS: 77 DEGREES
EKG P-R INTERVAL: 174 MS
EKG Q-T INTERVAL: 434 MS
EKG QRS DURATION: 80 MS
EKG QTC CALCULATION (BAZETT): 436 MS
EKG R AXIS: 47 DEGREES
EKG T AXIS: 46 DEGREES
EKG VENTRICULAR RATE: 61 BPM

## 2024-03-04 PROCEDURE — 93010 ELECTROCARDIOGRAM REPORT: CPT | Performed by: INTERNAL MEDICINE

## 2024-03-06 ENCOUNTER — OFFICE VISIT (OUTPATIENT)
Dept: PULMONOLOGY | Age: 84
End: 2024-03-06
Payer: MEDICARE

## 2024-03-06 VITALS
HEIGHT: 68 IN | RESPIRATION RATE: 16 BRPM | SYSTOLIC BLOOD PRESSURE: 140 MMHG | WEIGHT: 185.6 LBS | DIASTOLIC BLOOD PRESSURE: 86 MMHG | HEART RATE: 89 BPM | TEMPERATURE: 97.6 F | BODY MASS INDEX: 28.13 KG/M2 | OXYGEN SATURATION: 99 %

## 2024-03-06 DIAGNOSIS — Z76.89 ESTABLISHING CARE WITH NEW DOCTOR, ENCOUNTER FOR: Primary | ICD-10-CM

## 2024-03-06 DIAGNOSIS — R06.02 SOB (SHORTNESS OF BREATH): Primary | ICD-10-CM

## 2024-03-06 PROCEDURE — G8427 DOCREV CUR MEDS BY ELIG CLIN: HCPCS | Performed by: INTERNAL MEDICINE

## 2024-03-06 PROCEDURE — 1036F TOBACCO NON-USER: CPT | Performed by: INTERNAL MEDICINE

## 2024-03-06 PROCEDURE — 1123F ACP DISCUSS/DSCN MKR DOCD: CPT | Performed by: INTERNAL MEDICINE

## 2024-03-06 PROCEDURE — G8484 FLU IMMUNIZE NO ADMIN: HCPCS | Performed by: INTERNAL MEDICINE

## 2024-03-06 PROCEDURE — 99204 OFFICE O/P NEW MOD 45 MIN: CPT | Performed by: INTERNAL MEDICINE

## 2024-03-06 PROCEDURE — G8419 CALC BMI OUT NRM PARAM NOF/U: HCPCS | Performed by: INTERNAL MEDICINE

## 2024-03-06 RX ORDER — ALBUTEROL SULFATE 90 UG/1
2 AEROSOL, METERED RESPIRATORY (INHALATION) 4 TIMES DAILY PRN
Qty: 18 G | Refills: 0 | Status: SHIPPED | OUTPATIENT
Start: 2024-03-06

## 2024-03-06 NOTE — PROGRESS NOTES
Palpations: Abdomen is soft.   Musculoskeletal:         General: No tenderness or deformity.      Cervical back: Normal range of motion and neck supple.      Right lower leg: No edema.      Left lower leg: No edema.   Skin:     General: Skin is warm and dry.   Neurological:      Mental Status: He is alert and oriented to person, place, and time.   Psychiatric:         Judgment: Judgment normal.         Imaging studies films reviewed and interpreted by me chest x-ray March 2024 is clear  Lab results reviewed in chart    ECHO: February 2024 EF 60 to 65%  Assessment and Plan       Diagnosis Orders   1. SOB (shortness of breath)  Full PFT Study With Bronchodilator    albuterol sulfate HFA (VENTOLIN HFA) 108 (90 Base) MCG/ACT inhaler        Most likely asthma or asthma COPD overlap, will start as needed albuterol, will obtain PFT and reevaluate on follow-up.      Orders Placed This Encounter   Procedures    Full PFT Study With Bronchodilator     Standing Status:   Future     Standing Expiration Date:   9/6/2025     Orders Placed This Encounter   Medications    albuterol sulfate HFA (VENTOLIN HFA) 108 (90 Base) MCG/ACT inhaler     Sig: Inhale 2 puffs into the lungs 4 times daily as needed for Wheezing     Dispense:  18 g     Refill:  0            Discussed with patient the importance of exercise and weight control and  overall health and well-being.     Reviewed with the patient: current clinical status, medications, activities and diet.      Side effects, adverse effects of the medication prescribed today, as well as treatment plan and result expectations have been discussed with the patient who expresses understanding and desires to proceed.       Return in about 6 weeks (around 4/17/2024).      Simin Pepe MD

## 2024-07-21 ENCOUNTER — HOSPITAL ENCOUNTER (INPATIENT)
Age: 84
LOS: 3 days | Discharge: HOME | End: 2024-07-24
Attending: STUDENT IN AN ORGANIZED HEALTH CARE EDUCATION/TRAINING PROGRAM | Admitting: INTERNAL MEDICINE
Payer: MEDICARE

## 2024-07-21 ENCOUNTER — APPOINTMENT (OUTPATIENT)
Dept: GENERAL RADIOLOGY | Age: 84
End: 2024-07-21
Payer: MEDICARE

## 2024-07-21 ENCOUNTER — APPOINTMENT (OUTPATIENT)
Dept: CT IMAGING | Age: 84
End: 2024-07-21
Payer: MEDICARE

## 2024-07-21 DIAGNOSIS — K57.32 DIVERTICULITIS OF COLON: ICD-10-CM

## 2024-07-21 DIAGNOSIS — U07.1 COVID-19: Primary | ICD-10-CM

## 2024-07-21 DIAGNOSIS — J96.01 ACUTE RESPIRATORY FAILURE WITH HYPOXIA (HCC): ICD-10-CM

## 2024-07-21 DIAGNOSIS — R07.9 CHEST PAIN, UNSPECIFIED TYPE: ICD-10-CM

## 2024-07-21 PROBLEM — J12.82 PNEUMONIA DUE TO COVID-19 VIRUS: Status: ACTIVE | Noted: 2024-07-21

## 2024-07-21 LAB
ALBUMIN SERPL-MCNC: 4.3 G/DL (ref 3.5–4.6)
ALP SERPL-CCNC: 73 U/L (ref 35–104)
ALT SERPL-CCNC: 24 U/L (ref 0–41)
ANION GAP SERPL CALCULATED.3IONS-SCNC: 20 MEQ/L (ref 9–15)
AST SERPL-CCNC: 39 U/L (ref 0–40)
BACTERIA URNS QL MICRO: NEGATIVE /HPF
BASOPHILS # BLD: 0 K/UL (ref 0–0.2)
BASOPHILS NFR BLD: 0.2 %
BILIRUB SERPL-MCNC: 0.7 MG/DL (ref 0.2–0.7)
BILIRUB UR QL STRIP: NEGATIVE
BNP BLD-MCNC: 381 PG/ML
BUN SERPL-MCNC: 24 MG/DL (ref 8–23)
CALCIUM SERPL-MCNC: 9.2 MG/DL (ref 8.5–9.9)
CHLORIDE SERPL-SCNC: 96 MEQ/L (ref 95–107)
CK SERPL-CCNC: 116 U/L (ref 0–190)
CLARITY UR: CLEAR
CO2 SERPL-SCNC: 22 MEQ/L (ref 20–31)
COLOR UR: YELLOW
CREAT SERPL-MCNC: 1.58 MG/DL (ref 0.7–1.2)
EOSINOPHIL # BLD: 0 K/UL (ref 0–0.7)
EOSINOPHIL NFR BLD: 0 %
EPI CELLS #/AREA URNS AUTO: ABNORMAL /HPF (ref 0–5)
ERYTHROCYTE [DISTWIDTH] IN BLOOD BY AUTOMATED COUNT: 14.1 % (ref 11.5–14.5)
GLOBULIN SER CALC-MCNC: 3.8 G/DL (ref 2.3–3.5)
GLUCOSE SERPL-MCNC: 130 MG/DL (ref 70–99)
GLUCOSE UR STRIP-MCNC: NEGATIVE MG/DL
HCT VFR BLD AUTO: 46.9 % (ref 42–52)
HGB BLD-MCNC: 16.5 G/DL (ref 14–18)
HGB UR QL STRIP: ABNORMAL
HYALINE CASTS #/AREA URNS AUTO: ABNORMAL /HPF (ref 0–5)
KETONES UR STRIP-MCNC: NEGATIVE MG/DL
LACTIC ACID, SEPSIS: 0.9 MMOL/L (ref 0.5–1.9)
LACTIC ACID, SEPSIS: 1.8 MMOL/L (ref 0.5–1.9)
LEUKOCYTE ESTERASE UR QL STRIP: NEGATIVE
LYMPHOCYTES # BLD: 0.9 K/UL (ref 1–4.8)
LYMPHOCYTES NFR BLD: 18.9 %
MAGNESIUM SERPL-MCNC: 1.9 MG/DL (ref 1.7–2.4)
MCH RBC QN AUTO: 29.9 PG (ref 27–31.3)
MCHC RBC AUTO-ENTMCNC: 35.2 % (ref 33–37)
MCV RBC AUTO: 85.1 FL (ref 79–92.2)
MONOCYTES # BLD: 3.2 K/UL (ref 0.2–0.8)
MONOCYTES NFR BLD: 66.4 %
NEUTROPHILS # BLD: 0.7 K/UL (ref 1.4–6.5)
NEUTS SEG NFR BLD: 13.7 %
NITRITE UR QL STRIP: NEGATIVE
PATH INTERP BLD-IMP: YES
PH UR STRIP: 5 [PH] (ref 5–9)
PLATELET # BLD AUTO: 99 K/UL (ref 130–400)
PLATELET BLD QL SMEAR: ABNORMAL
POTASSIUM SERPL-SCNC: 4 MEQ/L (ref 3.4–4.9)
PROCALCITONIN SERPL IA-MCNC: 0.15 NG/ML (ref 0–0.15)
PROT SERPL-MCNC: 8.1 G/DL (ref 6.3–8)
PROT UR STRIP-MCNC: 100 MG/DL
RBC # BLD AUTO: 5.51 M/UL (ref 4.7–6.1)
RBC #/AREA URNS AUTO: ABNORMAL /HPF (ref 0–5)
RBC MORPH BLD: NORMAL
SARS-COV-2 RDRP RESP QL NAA+PROBE: DETECTED
SODIUM SERPL-SCNC: 138 MEQ/L (ref 135–144)
SP GR UR STRIP: 1.05 (ref 1–1.03)
TROPONIN, HIGH SENSITIVITY: 30 NG/L (ref 0–19)
TROPONIN, HIGH SENSITIVITY: 31 NG/L (ref 0–19)
TROPONIN, HIGH SENSITIVITY: 37 NG/L (ref 0–19)
URINE REFLEX TO CULTURE: ABNORMAL
UROBILINOGEN UR STRIP-ACNC: 1 E.U./DL
WBC # BLD AUTO: 4.9 K/UL (ref 4.8–10.8)
WBC #/AREA URNS AUTO: ABNORMAL /HPF (ref 0–5)

## 2024-07-21 PROCEDURE — XW033E5 INTRODUCTION OF REMDESIVIR ANTI-INFECTIVE INTO PERIPHERAL VEIN, PERCUTANEOUS APPROACH, NEW TECHNOLOGY GROUP 5: ICD-10-PCS | Performed by: INTERNAL MEDICINE

## 2024-07-21 PROCEDURE — 6370000000 HC RX 637 (ALT 250 FOR IP): Performed by: EMERGENCY MEDICINE

## 2024-07-21 PROCEDURE — 84484 ASSAY OF TROPONIN QUANT: CPT

## 2024-07-21 PROCEDURE — 96365 THER/PROPH/DIAG IV INF INIT: CPT

## 2024-07-21 PROCEDURE — 74177 CT ABD & PELVIS W/CONTRAST: CPT

## 2024-07-21 PROCEDURE — 71045 X-RAY EXAM CHEST 1 VIEW: CPT

## 2024-07-21 PROCEDURE — 83605 ASSAY OF LACTIC ACID: CPT

## 2024-07-21 PROCEDURE — 6370000000 HC RX 637 (ALT 250 FOR IP): Performed by: STUDENT IN AN ORGANIZED HEALTH CARE EDUCATION/TRAINING PROGRAM

## 2024-07-21 PROCEDURE — 81001 URINALYSIS AUTO W/SCOPE: CPT

## 2024-07-21 PROCEDURE — 36415 COLL VENOUS BLD VENIPUNCTURE: CPT

## 2024-07-21 PROCEDURE — 6360000002 HC RX W HCPCS: Performed by: INTERNAL MEDICINE

## 2024-07-21 PROCEDURE — 70450 CT HEAD/BRAIN W/O DYE: CPT

## 2024-07-21 PROCEDURE — 6360000004 HC RX CONTRAST MEDICATION: Performed by: STUDENT IN AN ORGANIZED HEALTH CARE EDUCATION/TRAINING PROGRAM

## 2024-07-21 PROCEDURE — 84145 PROCALCITONIN (PCT): CPT

## 2024-07-21 PROCEDURE — 83735 ASSAY OF MAGNESIUM: CPT

## 2024-07-21 PROCEDURE — 93005 ELECTROCARDIOGRAM TRACING: CPT

## 2024-07-21 PROCEDURE — 99285 EMERGENCY DEPT VISIT HI MDM: CPT

## 2024-07-21 PROCEDURE — 71275 CT ANGIOGRAPHY CHEST: CPT

## 2024-07-21 PROCEDURE — 2580000003 HC RX 258: Performed by: INTERNAL MEDICINE

## 2024-07-21 PROCEDURE — 82550 ASSAY OF CK (CPK): CPT

## 2024-07-21 PROCEDURE — 87635 SARS-COV-2 COVID-19 AMP PRB: CPT

## 2024-07-21 PROCEDURE — 83880 ASSAY OF NATRIURETIC PEPTIDE: CPT

## 2024-07-21 PROCEDURE — 85025 COMPLETE CBC W/AUTO DIFF WBC: CPT

## 2024-07-21 PROCEDURE — 2580000003 HC RX 258: Performed by: STUDENT IN AN ORGANIZED HEALTH CARE EDUCATION/TRAINING PROGRAM

## 2024-07-21 PROCEDURE — 80053 COMPREHEN METABOLIC PANEL: CPT

## 2024-07-21 PROCEDURE — 96375 TX/PRO/DX INJ NEW DRUG ADDON: CPT

## 2024-07-21 PROCEDURE — 1210000000 HC MED SURG R&B

## 2024-07-21 PROCEDURE — 6360000002 HC RX W HCPCS: Performed by: STUDENT IN AN ORGANIZED HEALTH CARE EDUCATION/TRAINING PROGRAM

## 2024-07-21 RX ORDER — CIPROFLOXACIN 2 MG/ML
400 INJECTION, SOLUTION INTRAVENOUS EVERY 12 HOURS
Status: DISCONTINUED | OUTPATIENT
Start: 2024-07-21 | End: 2024-07-22 | Stop reason: SDUPTHER

## 2024-07-21 RX ORDER — ACETAMINOPHEN 650 MG/1
650 SUPPOSITORY RECTAL EVERY 6 HOURS PRN
Status: DISCONTINUED | OUTPATIENT
Start: 2024-07-21 | End: 2024-07-24 | Stop reason: HOSPADM

## 2024-07-21 RX ORDER — AMOXICILLIN AND CLAVULANATE POTASSIUM 875; 125 MG/1; MG/1
1 TABLET, FILM COATED ORAL ONCE
Status: COMPLETED | OUTPATIENT
Start: 2024-07-21 | End: 2024-07-21

## 2024-07-21 RX ORDER — ASPIRIN 81 MG/1
81 TABLET ORAL DAILY
Status: DISCONTINUED | OUTPATIENT
Start: 2024-07-21 | End: 2024-07-24 | Stop reason: HOSPADM

## 2024-07-21 RX ORDER — ONDANSETRON 2 MG/ML
4 INJECTION INTRAMUSCULAR; INTRAVENOUS EVERY 6 HOURS PRN
Status: DISCONTINUED | OUTPATIENT
Start: 2024-07-21 | End: 2024-07-24 | Stop reason: HOSPADM

## 2024-07-21 RX ORDER — 0.9 % SODIUM CHLORIDE 0.9 %
1000 INTRAVENOUS SOLUTION INTRAVENOUS ONCE
Status: COMPLETED | OUTPATIENT
Start: 2024-07-21 | End: 2024-07-21

## 2024-07-21 RX ORDER — ENOXAPARIN SODIUM 100 MG/ML
40 INJECTION SUBCUTANEOUS DAILY
Status: DISCONTINUED | OUTPATIENT
Start: 2024-07-22 | End: 2024-07-24 | Stop reason: HOSPADM

## 2024-07-21 RX ORDER — METRONIDAZOLE 500 MG/100ML
500 INJECTION, SOLUTION INTRAVENOUS EVERY 8 HOURS
Status: DISCONTINUED | OUTPATIENT
Start: 2024-07-22 | End: 2024-07-22

## 2024-07-21 RX ORDER — DEXAMETHASONE SODIUM PHOSPHATE 10 MG/ML
6 INJECTION, SOLUTION INTRAMUSCULAR; INTRAVENOUS EVERY 24 HOURS
Status: DISCONTINUED | OUTPATIENT
Start: 2024-07-22 | End: 2024-07-22 | Stop reason: ALTCHOICE

## 2024-07-21 RX ORDER — SODIUM CHLORIDE 9 MG/ML
INJECTION, SOLUTION INTRAVENOUS PRN
Status: DISCONTINUED | OUTPATIENT
Start: 2024-07-21 | End: 2024-07-24 | Stop reason: HOSPADM

## 2024-07-21 RX ORDER — ONDANSETRON 4 MG/1
4 TABLET, ORALLY DISINTEGRATING ORAL EVERY 8 HOURS PRN
Status: DISCONTINUED | OUTPATIENT
Start: 2024-07-21 | End: 2024-07-24 | Stop reason: HOSPADM

## 2024-07-21 RX ORDER — MAGNESIUM SULFATE IN WATER 40 MG/ML
2000 INJECTION, SOLUTION INTRAVENOUS ONCE
Status: COMPLETED | OUTPATIENT
Start: 2024-07-21 | End: 2024-07-21

## 2024-07-21 RX ORDER — DEXAMETHASONE SODIUM PHOSPHATE 10 MG/ML
6 INJECTION, SOLUTION INTRAMUSCULAR; INTRAVENOUS ONCE
Status: COMPLETED | OUTPATIENT
Start: 2024-07-21 | End: 2024-07-21

## 2024-07-21 RX ORDER — GUAIFENESIN/DEXTROMETHORPHAN 100-10MG/5
5 SYRUP ORAL EVERY 4 HOURS PRN
Status: DISCONTINUED | OUTPATIENT
Start: 2024-07-21 | End: 2024-07-24 | Stop reason: HOSPADM

## 2024-07-21 RX ORDER — POLYETHYLENE GLYCOL 3350 17 G/17G
17 POWDER, FOR SOLUTION ORAL DAILY PRN
Status: DISCONTINUED | OUTPATIENT
Start: 2024-07-21 | End: 2024-07-24 | Stop reason: HOSPADM

## 2024-07-21 RX ORDER — SODIUM CHLORIDE 0.9 % (FLUSH) 0.9 %
5-40 SYRINGE (ML) INJECTION PRN
Status: DISCONTINUED | OUTPATIENT
Start: 2024-07-21 | End: 2024-07-24 | Stop reason: HOSPADM

## 2024-07-21 RX ORDER — ACETAMINOPHEN 500 MG
1000 TABLET ORAL ONCE
Status: COMPLETED | OUTPATIENT
Start: 2024-07-21 | End: 2024-07-21

## 2024-07-21 RX ORDER — ACETAMINOPHEN 325 MG/1
650 TABLET ORAL EVERY 6 HOURS PRN
Status: DISCONTINUED | OUTPATIENT
Start: 2024-07-21 | End: 2024-07-24 | Stop reason: HOSPADM

## 2024-07-21 RX ORDER — SODIUM CHLORIDE 0.9 % (FLUSH) 0.9 %
5-40 SYRINGE (ML) INJECTION EVERY 12 HOURS SCHEDULED
Status: DISCONTINUED | OUTPATIENT
Start: 2024-07-21 | End: 2024-07-24 | Stop reason: HOSPADM

## 2024-07-21 RX ADMIN — REMDESIVIR 200 MG: 100 INJECTION, POWDER, LYOPHILIZED, FOR SOLUTION INTRAVENOUS at 23:44

## 2024-07-21 RX ADMIN — AMOXICILLIN AND CLAVULANATE POTASSIUM 1 TABLET: 875; 125 TABLET, FILM COATED ORAL at 21:21

## 2024-07-21 RX ADMIN — SODIUM CHLORIDE, PRESERVATIVE FREE 10 ML: 5 INJECTION INTRAVENOUS at 23:46

## 2024-07-21 RX ADMIN — SODIUM CHLORIDE 1000 ML: 9 INJECTION, SOLUTION INTRAVENOUS at 18:43

## 2024-07-21 RX ADMIN — ACETAMINOPHEN 1000 MG: 500 TABLET ORAL at 18:45

## 2024-07-21 RX ADMIN — MAGNESIUM SULFATE HEPTAHYDRATE 2000 MG: 40 INJECTION, SOLUTION INTRAVENOUS at 18:44

## 2024-07-21 RX ADMIN — DEXAMETHASONE SODIUM PHOSPHATE 6 MG: 10 INJECTION, SOLUTION INTRAMUSCULAR; INTRAVENOUS at 18:40

## 2024-07-21 RX ADMIN — IOPAMIDOL 75 ML: 755 INJECTION, SOLUTION INTRAVENOUS at 18:38

## 2024-07-21 ASSESSMENT — LIFESTYLE VARIABLES
HOW MANY STANDARD DRINKS CONTAINING ALCOHOL DO YOU HAVE ON A TYPICAL DAY: PATIENT DOES NOT DRINK
HOW OFTEN DO YOU HAVE A DRINK CONTAINING ALCOHOL: NEVER
HOW OFTEN DO YOU HAVE A DRINK CONTAINING ALCOHOL: NEVER
HOW MANY STANDARD DRINKS CONTAINING ALCOHOL DO YOU HAVE ON A TYPICAL DAY: PATIENT DOES NOT DRINK

## 2024-07-21 ASSESSMENT — PAIN DESCRIPTION - ONSET: ONSET: GRADUAL

## 2024-07-21 ASSESSMENT — PAIN DESCRIPTION - FREQUENCY: FREQUENCY: INTERMITTENT

## 2024-07-21 ASSESSMENT — PAIN DESCRIPTION - DESCRIPTORS: DESCRIPTORS: BURNING;ACHING;SORE

## 2024-07-21 ASSESSMENT — PAIN DESCRIPTION - ORIENTATION: ORIENTATION: INNER;MID

## 2024-07-21 ASSESSMENT — PAIN DESCRIPTION - LOCATION
LOCATION: CHEST
LOCATION: CHEST
LOCATION: HEAD

## 2024-07-21 ASSESSMENT — PAIN - FUNCTIONAL ASSESSMENT
PAIN_FUNCTIONAL_ASSESSMENT: 0-10
PAIN_FUNCTIONAL_ASSESSMENT: ACTIVITIES ARE NOT PREVENTED

## 2024-07-21 ASSESSMENT — PAIN SCALES - GENERAL
PAINLEVEL_OUTOF10: 0
PAINLEVEL_OUTOF10: 4

## 2024-07-21 ASSESSMENT — PAIN DESCRIPTION - PAIN TYPE: TYPE: ACUTE PAIN

## 2024-07-21 NOTE — ED TRIAGE NOTES
Arrived with report of chest pain x3 weeks  Pt states pain is getting worse so he came in today  Pt is extremely hard of hearing   SALINA level of pain  Pt states he also has an empty stomach

## 2024-07-21 NOTE — ED NOTES
Patient tested positive for Covid.  Isolation cart placed outside of room, sign on door and door closed.

## 2024-07-21 NOTE — ED PROVIDER NOTES
[facility] ED  eMERGENCY dEPARTMENT eNCOUnter      Pt Name: Pavan Manley  MRN: [id number]  Birthdate 1940  Date of evaluation: 7/21/2024  Provider: Mayito Welsh MD  Note Started: 7/21/24 5:01 PM EDT    HISTORY OF PRESENT ILLNESS      Chief Complaint   Patient presents with    Chest Pain     X3 weeks   Pain is getting worse       The history is provided by the Patient.  Pavan Manley is a 84 y.o. male with a PMH clinically significant for HTN, HLD, CKD, CAD s/p stenting, and Tobacco Smoking presenting to the ED via PV c/o multiple complaints including chest pain, shortness of breath, cough productive of yellow sputum, generalized weakness/fatigue, decreased appetite, loss of taste and headache ongoing for the past 2 to 3 weeks.  Patient states symptoms are almost constant.  Characterizing headache as frontal, constant aching and throbbing.  Denying any associated vision changes, photophobia, neck pain/stiffness, numbness, weakness, tingling.  States that his chest pain is worse when he coughs.  Also worse when he walks.  Unable to characterize the pain any further.  Also complaining of epigastric abdominal pain.  Unable to characterize this further either.  States he has not been feeling well.  Denies any history of regular O2 use or COPD.  Denies any associated: Hemoptysis, Orthopnea, PND, New or worsening BLE Edema/pain, Vomiting, Diarrhea, Constipation, Dysuria, Hematuria, or Difficulty urinating.  Denies preceding Falls, Trauma, Exertional or strenuous activities, or Abnormal PO intake.  States no history of similar previous episodes.  States they have otherwise been feeling well.  Taking all medications as indicated: yes.    Per Chart Review: PMH as noted above obtained via outpatient chart review.       REVIEW OF SYSTEMS       Review of Systems  Otherwise as noted in HPI    PAST MEDICAL HISTORY     Past Medical History:   Diagnosis Date    CAD (coronary artery disease)        SURGICAL HISTORY    respiratory distress.      Breath sounds: Rales present.   Chest:      Chest wall: No tenderness.   Abdominal:      Palpations: Abdomen is soft.      Tenderness: There is abdominal tenderness. There is no guarding or rebound.   Musculoskeletal:      Right lower leg: No edema.      Left lower leg: No edema.   Skin:     General: Skin is warm and dry.      Capillary Refill: Capillary refill takes less than 2 seconds.   Neurological:      Mental Status: He is oriented to person, place, and time.      Motor: Weakness (generalized) present.         MDM:   Chart Reviewed: PMH and additional information as noted in HPI obtained from outside chart review.    Vitals:    Vitals:    07/21/24 1900 07/21/24 1910 07/21/24 1930 07/21/24 2000   BP: (!) 157/76  (!) 156/80 (!) 145/74   Pulse:   72 75   Resp:   18 18   Temp:       TempSrc:       SpO2: 91% (!) 88% 90% 91%   Weight:       Height:           PROCEDURES:  Unless otherwise noted below, none  Procedures    LABS:  Labs Reviewed   COVID-19, RAPID - Abnormal; Notable for the following components:       Result Value    SARS-CoV-2, NAAT DETECTED (*)     All other components within normal limits   CBC WITH AUTO DIFFERENTIAL - Abnormal; Notable for the following components:    Platelets 99 (*)     Neutrophils Absolute 0.7 (*)     Lymphocytes Absolute 0.9 (*)     Monocytes Absolute 3.2 (*)     All other components within normal limits   TROPONIN - Abnormal; Notable for the following components:    Troponin, High Sensitivity 30 (*)     All other components within normal limits   TROPONIN - Abnormal; Notable for the following components:    Troponin, High Sensitivity 31 (*)     All other components within normal limits   TROPONIN - Abnormal; Notable for the following components:    Troponin, High Sensitivity 37 (*)     All other components within normal limits   COMPREHENSIVE METABOLIC PANEL - Abnormal; Notable for the following components:    Anion Gap 20 (*)     Glucose 130 (*)      BUN 24 (*)     Creatinine 1.58 (*)     Est, Glom Filt Rate 42.8 (*)     Total Protein 8.1 (*)     Globulin 3.8 (*)     All other components within normal limits   LACTATE, SEPSIS   LACTATE, SEPSIS   PROCALCITONIN   BRAIN NATRIURETIC PEPTIDE   CK   MAGNESIUM   URINALYSIS WITH REFLEX TO CULTURE       CT HEAD WO CONTRAST   Final Result   1. No acute intracranial abnormality.   2. Pansinus mucosal thickening.         CTA CHEST W WO CONTRAST   Final Result   1. No evidence of pulmonary embolism or acute pulmonary abnormality.   2. Mild emphysema and bronchitis.   3. Aortic and coronary calcification.         CT ABDOMEN PELVIS W IV CONTRAST Additional Contrast? None   Final Result   1. Small focal area of short segment diverticulitis/colitis at the splenic   flexure.   2. Mild ileus.   3. Mild fatty liver.   4. Small hiatal hernia.   5. Small fat filled umbilical and inguinal hernias.   6. Coronary artery disease.         XR CHEST PORTABLE   Final Result   No acute process.             ED Course as of 07/21/24 2116   Sun Jul 21, 2024 1829 SARS-CoV-2, NAAT(!): DETECTED [NA]   1904 XR CHEST PORTABLE  CXR showing no gross acute cardiopulmonary abnormalities. [NA]   1905 CT HEAD WO CONTRAST  CTH W/O showing no acute intracranial abnormalities. [NA]   1906 EKG 12 Lead  EKG showing normal sinus rhythm, rate of 93 bpm.  Normal axis.  Normal intervals.  Nonspecific ST-T wave abnormalities. [NA]      ED Course User Index  [NA] Joo Rosado MD       84 y.o. male with a PMH clinically significant for HTN, HLD, CKD, CAD s/p stenting, and Tobacco Smoking presenting to the ED via PV c/o multiple complaints including chest pain, shortness of breath, cough productive of yellow sputum, generalized weakness/fatigue, decreased appetite, loss of taste and headache ongoing for the past 2 to 3 weeks.  Upon initial evaluation, Pt generally ill-appearing, tachypneic, and borderline hypoxic , but otherwise Afebrile, HDS and in NAD. PE

## 2024-07-22 PROBLEM — R09.02 HYPOXIA: Status: ACTIVE | Noted: 2024-07-22

## 2024-07-22 PROBLEM — K57.92 DIVERTICULITIS: Status: ACTIVE | Noted: 2024-07-22

## 2024-07-22 LAB
ANION GAP SERPL CALCULATED.3IONS-SCNC: 18 MEQ/L (ref 9–15)
BASOPHILS # BLD: 0 K/UL (ref 0–0.2)
BASOPHILS NFR BLD: 0.4 %
BUN SERPL-MCNC: 32 MG/DL (ref 8–23)
CALCIUM SERPL-MCNC: 8.4 MG/DL (ref 8.5–9.9)
CHLORIDE SERPL-SCNC: 99 MEQ/L (ref 95–107)
CO2 SERPL-SCNC: 21 MEQ/L (ref 20–31)
CREAT SERPL-MCNC: 1.54 MG/DL (ref 0.7–1.2)
CRP SERPL HS-MCNC: 124.8 MG/L (ref 0–5)
EKG ATRIAL RATE: 93 BPM
EKG P AXIS: 82 DEGREES
EKG P-R INTERVAL: 156 MS
EKG Q-T INTERVAL: 360 MS
EKG QRS DURATION: 80 MS
EKG QTC CALCULATION (BAZETT): 447 MS
EKG R AXIS: 81 DEGREES
EKG T AXIS: 79 DEGREES
EKG VENTRICULAR RATE: 93 BPM
EOSINOPHIL # BLD: 0 K/UL (ref 0–0.7)
EOSINOPHIL NFR BLD: 0.4 %
ERYTHROCYTE [DISTWIDTH] IN BLOOD BY AUTOMATED COUNT: 14.2 % (ref 11.5–14.5)
GLUCOSE SERPL-MCNC: 157 MG/DL (ref 70–99)
HCT VFR BLD AUTO: 44.1 % (ref 42–52)
HGB BLD-MCNC: 15 G/DL (ref 14–18)
LYMPHOCYTES # BLD: 0.5 K/UL (ref 1–4.8)
LYMPHOCYTES NFR BLD: 16 %
MCH RBC QN AUTO: 29.1 PG (ref 27–31.3)
MCHC RBC AUTO-ENTMCNC: 34 % (ref 33–37)
MCV RBC AUTO: 85.6 FL (ref 79–92.2)
MONOCYTES # BLD: 1 K/UL (ref 0.2–0.8)
MONOCYTES NFR BLD: 38.1 %
MYELOCYTES NFR BLD MANUAL: 1 %
NEUTROPHILS # BLD: 1 K/UL (ref 1.4–6.5)
NEUTS SEG NFR BLD: 40 %
PATH INTERP BLD-IMP: NORMAL
PERFORMED ON: ABNORMAL
PLATELET # BLD AUTO: 112 K/UL (ref 130–400)
PLATELET BLD QL SMEAR: ABNORMAL
POC CREATININE: 1.8 MG/DL (ref 0.8–1.3)
POC SAMPLE TYPE: ABNORMAL
POTASSIUM SERPL-SCNC: 4 MEQ/L (ref 3.4–4.9)
PROCALCITONIN SERPL IA-MCNC: 0.17 NG/ML (ref 0–0.15)
RBC # BLD AUTO: 5.15 M/UL (ref 4.7–6.1)
SLIDE REVIEW: ABNORMAL
SMUDGE CELLS BLD QL SMEAR: 17.1
SODIUM SERPL-SCNC: 138 MEQ/L (ref 135–144)
VARIANT LYMPHS NFR BLD: 5 %
WBC # BLD AUTO: 2.5 K/UL (ref 4.8–10.8)

## 2024-07-22 PROCEDURE — 99222 1ST HOSP IP/OBS MODERATE 55: CPT | Performed by: INTERNAL MEDICINE

## 2024-07-22 PROCEDURE — 86140 C-REACTIVE PROTEIN: CPT

## 2024-07-22 PROCEDURE — 2580000003 HC RX 258: Performed by: INTERNAL MEDICINE

## 2024-07-22 PROCEDURE — 6360000002 HC RX W HCPCS: Performed by: INTERNAL MEDICINE

## 2024-07-22 PROCEDURE — 84145 PROCALCITONIN (PCT): CPT

## 2024-07-22 PROCEDURE — 80048 BASIC METABOLIC PNL TOTAL CA: CPT

## 2024-07-22 PROCEDURE — 36415 COLL VENOUS BLD VENIPUNCTURE: CPT

## 2024-07-22 PROCEDURE — 82306 VITAMIN D 25 HYDROXY: CPT

## 2024-07-22 PROCEDURE — 6370000000 HC RX 637 (ALT 250 FOR IP): Performed by: INTERNAL MEDICINE

## 2024-07-22 PROCEDURE — 85025 COMPLETE CBC W/AUTO DIFF WBC: CPT

## 2024-07-22 PROCEDURE — 97161 PT EVAL LOW COMPLEX 20 MIN: CPT

## 2024-07-22 PROCEDURE — 1210000000 HC MED SURG R&B

## 2024-07-22 PROCEDURE — 97166 OT EVAL MOD COMPLEX 45 MIN: CPT

## 2024-07-22 RX ORDER — CIPROFLOXACIN 2 MG/ML
400 INJECTION, SOLUTION INTRAVENOUS EVERY 12 HOURS
Status: DISCONTINUED | OUTPATIENT
Start: 2024-07-23 | End: 2024-07-22

## 2024-07-22 RX ORDER — CIPROFLOXACIN 2 MG/ML
400 INJECTION, SOLUTION INTRAVENOUS EVERY 12 HOURS
Status: DISCONTINUED | OUTPATIENT
Start: 2024-07-22 | End: 2024-07-22

## 2024-07-22 RX ORDER — DEXAMETHASONE SODIUM PHOSPHATE 4 MG/ML
6 INJECTION, SOLUTION INTRA-ARTICULAR; INTRALESIONAL; INTRAMUSCULAR; INTRAVENOUS; SOFT TISSUE EVERY 24 HOURS
Status: DISCONTINUED | OUTPATIENT
Start: 2024-07-22 | End: 2024-07-24 | Stop reason: HOSPADM

## 2024-07-22 RX ADMIN — GUAIFENESIN SYRUP AND DEXTROMETHORPHAN 5 ML: 100; 10 SYRUP ORAL at 23:19

## 2024-07-22 RX ADMIN — PIPERACILLIN AND TAZOBACTAM 4500 MG: 4; .5 INJECTION, POWDER, LYOPHILIZED, FOR SOLUTION INTRAVENOUS at 16:33

## 2024-07-22 RX ADMIN — CIPROFLOXACIN 400 MG: 200 INJECTION, SOLUTION INTRAVENOUS at 00:27

## 2024-07-22 RX ADMIN — ASPIRIN 81 MG: 81 TABLET, COATED ORAL at 08:19

## 2024-07-22 RX ADMIN — METRONIDAZOLE 500 MG: 500 INJECTION, SOLUTION INTRAVENOUS at 01:42

## 2024-07-22 RX ADMIN — ACETAMINOPHEN 325MG 650 MG: 325 TABLET ORAL at 13:24

## 2024-07-22 RX ADMIN — ENOXAPARIN SODIUM 40 MG: 100 INJECTION SUBCUTANEOUS at 08:19

## 2024-07-22 RX ADMIN — ASPIRIN 81 MG: 81 TABLET, COATED ORAL at 00:28

## 2024-07-22 RX ADMIN — SODIUM CHLORIDE, PRESERVATIVE FREE 10 ML: 5 INJECTION INTRAVENOUS at 08:26

## 2024-07-22 RX ADMIN — CIPROFLOXACIN 400 MG: 200 INJECTION, SOLUTION INTRAVENOUS at 13:21

## 2024-07-22 RX ADMIN — METRONIDAZOLE 500 MG: 500 INJECTION, SOLUTION INTRAVENOUS at 08:29

## 2024-07-22 RX ADMIN — DEXAMETHASONE SODIUM PHOSPHATE 6 MG: 4 INJECTION INTRA-ARTICULAR; INTRALESIONAL; INTRAMUSCULAR; INTRAVENOUS; SOFT TISSUE at 10:58

## 2024-07-22 RX ADMIN — REMDESIVIR 100 MG: 100 INJECTION, POWDER, LYOPHILIZED, FOR SOLUTION INTRAVENOUS at 20:34

## 2024-07-22 RX ADMIN — ACETAMINOPHEN 325MG 650 MG: 325 TABLET ORAL at 23:18

## 2024-07-22 RX ADMIN — PIPERACILLIN AND TAZOBACTAM 3375 MG: 3; .375 INJECTION, POWDER, LYOPHILIZED, FOR SOLUTION INTRAVENOUS at 21:30

## 2024-07-22 ASSESSMENT — PAIN DESCRIPTION - LOCATION: LOCATION: HEAD

## 2024-07-22 ASSESSMENT — PAIN SCALES - GENERAL
PAINLEVEL_OUTOF10: 3
PAINLEVEL_OUTOF10: 3

## 2024-07-22 ASSESSMENT — ENCOUNTER SYMPTOMS
SHORTNESS OF BREATH: 1
EYES NEGATIVE: 1
GASTROINTESTINAL NEGATIVE: 1
STRIDOR: 0
COUGH: 1

## 2024-07-22 ASSESSMENT — PAIN DESCRIPTION - DESCRIPTORS: DESCRIPTORS: ACHING

## 2024-07-22 NOTE — PROGRESS NOTES
[facility] OCCUPATIONAL THERAPY EVALUATION - ACUTE     NAME: Pavan Manley  : 1940 (84 y.o.)  MRN: [id number]  CODE STATUS: Full Code  Room: [facility]/W277-01    Date of Service: 2024    Patient Diagnosis(es): Diverticulitis of colon [K57.32]  Acute respiratory failure with hypoxia (HCC) [J96.01]  Chest pain, unspecified type [R07.9]  Pneumonia due to COVID-19 virus [U07.1, J12.82]  COVID-19 [U07.1]   Patient Active Problem List    Diagnosis Date Noted    Pneumonia due to COVID-19 virus 2024        Past Medical History:   Diagnosis Date    CAD (coronary artery disease)     Hx PCI x 2, balloon angioplasty x1 ([id number]).  Follows with Dr. Rodriguez.    CKD stage 3a, GFR 45-59 ml/min ([facility])     GERD (gastroesophageal reflux disease)     Hard of hearing     HLD (hyperlipidemia)     Immunization not carried out because of patient refusal     Refuses immunizations     Past Surgical History:   Procedure Laterality Date    CHOLECYSTECTOMY      CORONARY ANGIOPLASTY WITH STENT PLACEMENT Right 2    LEG SURGERY          Restrictions  Restrictions/Precautions: Fall Risk, Up as Tolerated, Isolation (Covid)     Safety Devices: Safety Devices  Type of Devices: All fall risk precautions in place;Call light within reach;Chair alarm in place;Left in chair     Patient's date of birth confirmed: Yes    General:  Chart Reviewed: Yes  Patient assessed for rehabilitation services?: Yes    Subjective  Subjective: \"Well, I'm here\"       Pain at start of treatment: No    Pain at end of treatment: No    Location:   Description:   Nursing notified: Not Applicable  RN:   Intervention: Repositioned    Prior Level of Function:  Social/Functional History  Lives With: Alone  Type of Home: House  Home Layout: One level  Home Access: Stairs to enter with rails  Entrance Stairs - Number of Steps: 3  Bathroom Shower/Tub: Tub/Shower unit  Bathroom Equipment: Hand-held shower  Home Equipment: None  Has the patient had two or more falls in the past  Supervision  Transfer Comments: Slightly impulsive with transitions    Patient ambulated to/from bathroom with No device at Supervision level. Mildly impulsive with turns, decreased attention to IV line.     Bed Mobility  Bed mobility  Supine to Sit: Modified independent  Sit to Supine: Unable to assess (Up to chair at end of tx to promote OOB activity)  Bed Mobility Comments: No significant difficulty with bed mobility    Seated and Standing Balance:  Balance  Sitting: Intact  Standing: Impaired  Standing - Static: Fair  Standing - Dynamic: Fair    Functional Endurance:  Activity Tolerance  Activity Tolerance: Patient Tolerated treatment well    D/C Recommendations:  OT D/C RECOMMENDATIONS  REQUIRES OT FOLLOW-UP: Yes    Equipment Recommendations:  OT Equipment Recommendations  Other: Continue to assess    OT Education:   Patient Education  Education Given To: Patient  Education Provided: Role of Therapy;Plan of Care  Education Method: Verbal  Barriers to Learning: None  Education Outcome: Verbalized understanding    OT Follow Up:   OT D/C RECOMMENDATIONS  REQUIRES OT FOLLOW-UP: Yes       Assessment/Discharge Disposition:  Assessment: Pt is an 84 year old man from home alone who presents to [facility] with covid pneumonia who demonstrates the above deficits. He is limited d/t fatigue and decreased balance d/t impulsivity. Pt would benefit from continued OT to maximize independence and safety with ADL tasks.  Performance deficits / Impairments: Decreased ADL status, Decreased strength, Decreased endurance, Decreased balance, Decreased high-level IADLs, Decreased safe awareness, Decreased functional mobility   Prognosis: Good  Discharge Recommendations: Continue to assess pending progress  Decision Making: Medium Complexity     History: Pt's medical history is moderately complex  Exam: Pt has 7 performance deficits  Assistance / Modification: Pt requires min A    AMPAC (Six Click) Self care Score   How much help is needed

## 2024-07-22 NOTE — PLAN OF CARE
See OT evaluation for all goals and OT POC. Electronically signed by Pricila Varela OTALBA/L on 7/22/2024 at 11:14 AM

## 2024-07-22 NOTE — CONSULTS
Infectious Disease     Patient Name: Pavan Manley  Date: 7/22/2024  YOB: 1940  Medical Record Number: [id number]        COVID-19 with hypoxia  Diverticulitis      History of Present Illness:  History of COPD on home O2 coronary disease      Shortness of breath productive cough weakness fatigue loss of appetite loss of sense of taste headache ongoing 2 to 3 weeks possibly  Headache worsening cough worsening some abdominal discomfort    Mildly positive COVID-19 per records she is not been vaccinated  Saturation is 88% in the emergency department placed on supplemental O2 2 L      CT scan abdomen small area of inflammation around the splenic flexure possible diverticulitis versus colitis per CT    CT scan of chest no PE few scattered patchy infiltrates   Chest x-ray shows no infiltrates      Leukopenic white count 2.5 ANC of 1  Procalcitonin 1.71    Urinalysis negative                Review of Systems   Constitutional:  Positive for fatigue and fever.   HENT: Negative.     Eyes: Negative.    Respiratory:  Positive for cough and shortness of breath. Negative for stridor.    Cardiovascular: Negative.    Gastrointestinal: Negative.    Endocrine: Negative.    Genitourinary: Negative.    Musculoskeletal: Negative.    Skin: Negative.        Review of Systems: All 14 review of systems negative other than as stated above    Social History     Tobacco Use    Smoking status: Former     Current packs/day: 1.00     Average packs/day: 1 pack/day for 25.6 years (25.6 ttl pk-yrs)     Types: Cigarettes     Start date: 1/1/1999   Vaping Use    Vaping Use: Never used   Substance Use Topics    Alcohol use: No     Alcohol/week: 0.0 standard drinks of alcohol    Drug use: No         Past Medical History:   Diagnosis Date    CAD (coronary artery disease)     Hx PCI x 2, balloon angioplasty x1 ([id number]).  Follows with Dr. Rodriguez.    CKD stage 3a, GFR 45-59 ml/min ([facility])     GERD (gastroesophageal reflux disease)     Hard  diverticulitis (based on CT) now developing leukopenia

## 2024-07-22 NOTE — H&P
DEPARTMENT OF HOSPITAL MEDICINE    HISTORY AND PHYSICAL EXAM    PATIENT NAME:  Pavan Manley    MRN:  [id number]  SERVICE DATE:  7/21/2024   SERVICE TIME:  9:25 PM    Primary Care Physician: No primary care provider on file.     SUBJECTIVE  CHIEF COMPLAINT: Multiple    HPI:  Pavan Manley is a 84 y.o. male who presents with above complaints.    Patient is an 84-year-old male who presents to ED with multiple complaints including chest discomfort, dyspnea, productive cough, generalized weakness and fatigue, decreased appetite, changes loss of sense of taste, and headache with some symptoms present as long as 2-3 weeks.  States symptoms are nearly constant.  Headache described as frontal bilateral, throbbing.  No associated vision changes, focal weakness/sensation changes, or meningeal signs.  Frequent cough.  Dyspnea worse with ambulation.  Additionally having some upper abdominal discomfort.  Does not use home O2, and denies history of COPD.  Review of prior records demonstrates that he does have coronary artery disease for which she has had 2 stents and 1 balloon angioplasty in the past, with most recent stent relatively recently in February 2024.  He follows with Dr. Rodriguez of [facility] for this issue, and seems to have been on DAPT per available outside records.  Outside records do also indicate that patient refuses vaccination, and not infrequently refuses outpatient laboratory draws and follow-up appointments recommended by physicians with whom he follows.  He is found to be a somewhat poor historian.  Workup in the ED is significant for positive COVID-19 status.  He is found to have hypoxia as low as 88% at rest in the ED, for which she is started on 2 L supplemental oxygen with normalization of SpO2 and improvement in subjective dyspnea.  CTA of the chest is negative for PE.  CT abdomen/pelvis is significant for likely short segment splenic flexure colitis versus diverticulitis.  Patient is placed on supplemental oxygen,  Smoking status: Former     Current packs/day: 1.00     Average packs/day: 1 pack/day for 25.6 years (25.6 ttl pk-yrs)     Types: Cigarettes     Start date: 1/1/1999    Smokeless tobacco: Not on file   Vaping Use    Vaping Use: Never used   Substance and Sexual Activity    Alcohol use: No     Alcohol/week: 0.0 standard drinks of alcohol    Drug use: No    Sexual activity: Not on file   Other Topics Concern    Not on file   Social History Narrative    Not on file     Social Determinants of Health     Financial Resource Strain: Not on file   Food Insecurity: Not on file   Transportation Needs: Not on file   Physical Activity: Not on file   Stress: Not on file   Social Connections: Not on file   Intimate Partner Violence: Not on file   Housing Stability: Not on file     MEDICATIONS:   Prior to Admission medications    Medication Sig Start Date End Date Taking? Authorizing Provider   albuterol sulfate HFA (VENTOLIN HFA) 108 (90 Base) MCG/ACT inhaler Inhale 2 puffs into the lungs 4 times daily as needed for Wheezing 3/6/24   Simin Pepe MD   aspirin 81 MG tablet Take 1 tablet by mouth 2 times daily    ProviderBebe MD   pravastatin (PRAVACHOL) 40 MG tablet Take 40 mg by mouth daily  Patient not taking: Reported on 2/6/2024    ProviderBebe MD   omeprazole (PRILOSEC) 40 MG capsule Take 1 capsule by mouth daily  Patient not taking: Reported on 2/6/2024 12/17/15   Cody Stafford MD       ALLERGIES: Patient has no known allergies.    REVIEW OF SYSTEM:   A full 12 point review of systems was completed, and was unremarkable except as specified above.      OBJECTIVE    /75   Pulse 78   Temp 98.3 °F (36.8 °C) (Oral)   Resp 18   Ht 1.727 m (5' 8\")   Wt 85.3 kg (188 lb)   SpO2 93%   BMI 28.59 kg/m²     PHYSICAL EXAM:   Constitutional: Elderly adult male reclining left lateral decubitus in bed on room air in no acute distress, with no dyspnea at time of exam.  Head: NCAT  Eyes: PERRLA, EOMI  ENT:  radiation dose to as low as reasonably achievable. COMPARISON: None. HISTORY: ORDERING SYSTEM PROVIDED HISTORY: HA TECHNOLOGIST PROVIDED HISTORY: Has a \"code stroke\" or \"stroke alert\" been called?->No Reason for exam:->HA Decision Support Exception - unselect if not a suspected or confirmed emergency medical condition->Emergency Medical Condition (MA) What reading provider will be dictating this exam?->CRC FINDINGS: BRAIN/VENTRICLES: There is no acute intracranial hemorrhage, mass effect or midline shift.  No abnormal extra-axial fluid collection.  The gray-white differentiation is maintained without evidence of an acute infarct.  There is no evidence of hydrocephalus. ORBITS: The visualized portion of the orbits demonstrate no acute abnormality. SINUSES: Pansinus mucosal thickening. SOFT TISSUES/SKULL:  No acute abnormality of the visualized skull or soft tissues.     1. No acute intracranial abnormality. 2. Pansinus mucosal thickening.     XR CHEST PORTABLE    Result Date: 7/21/2024  EXAMINATION: ONE XRAY VIEW OF THE CHEST 7/21/2024 5:26 pm COMPARISON: 03/02/2024 HISTORY: ORDERING SYSTEM PROVIDED HISTORY: chest pain TECHNOLOGIST PROVIDED HISTORY: Reason for exam:->chest pain What reading provider will be dictating this exam?->CRC FINDINGS: The lungs are without acute focal process.  There is no effusion or pneumothorax. The cardiomediastinal silhouette is without acute process. The osseous structures are without acute process.     No acute process.       VTE Prophylaxis: Lovenox    ASSESSMENT AND PLAN    Acute Problems:  COVID-19 with hypoxia requring new 2L O2 by NC in ED  Probable short segment splenic flexure colitis/diverticultis  Weakness and mild confusion, likely due to the above  Thrombocytopenia, likely secondary to simultaneous acute infectious processes    Chronic Problems:   CKD stage 3A  CAD s/p PCI of distal left circ (2008), balloon angioplasty circumflex (2011), PCI of RCA (Feb 2024)  Refuses

## 2024-07-22 NOTE — PROGRESS NOTES
[facility] Hospitalist Progress Note    Admitting Date and Time: 7/21/2024  4:44 PM  Admit Dx: Diverticulitis of colon [K57.32]  Acute respiratory failure with hypoxia (HCC) [J96.01]  Chest pain, unspecified type [R07.9]  Pneumonia due to COVID-19 virus [U07.1, J12.82]  COVID-19 [U07.1]    Subjective:    No acute events overnight. During my evaluation patient was on RA and reported feeling much better. Denied abdominal pain    ROS: denies fever, chills, cp, sob, n/v, HA unless stated above.       dexAMETHasone  6 mg IntraVENous Q24H    sodium chloride flush  5-40 mL IntraVENous 2 times per day    enoxaparin  40 mg SubCUTAneous Daily    ciprofloxacin  400 mg IntraVENous Q12H    metroNIDAZOLE  500 mg IntraVENous Q8H    aspirin  81 mg Oral Daily    remdesivir 100 mg in sodium chloride 0.9 % 250 mL IVPB  100 mg IntraVENous Q24H     sodium chloride flush, 5-40 mL, PRN  sodium chloride, , PRN  ondansetron, 4 mg, Q8H PRN   Or  ondansetron, 4 mg, Q6H PRN  polyethylene glycol, 17 g, Daily PRN  acetaminophen, 650 mg, Q6H PRN   Or  acetaminophen, 650 mg, Q6H PRN  guaiFENesin-dextromethorphan, 5 mL, Q4H PRN         Objective:    BP 91/61   Pulse 55   Temp 97.5 °F (36.4 °C) (Oral)   Resp 18   Ht 1.727 m (5' 8\")   Wt 85.3 kg (188 lb)   SpO2 93%   BMI 28.59 kg/m²     General Appearance: alert and oriented to person, place and time and in no acute distress  Skin: warm and dry  Head: normocephalic and atraumatic  Eyes: pupils equal, round, and reactive to light, extraocular eye movements intact, conjunctivae normal  Neck: neck supple and non tender without mass   Pulmonary/Chest: clear to auscultation bilaterally- no wheezes, rales or rhonchi, normal air movement, no respiratory distress  Cardiovascular: normal rate, normal S1 and S2 and no carotid bruits  Abdomen: soft, non-tender, non-distended, normal bowel sounds, no masses or organomegaly  Extremities: no cyanosis, no clubbing and no edema  Neurologic: no  cranial nerve deficit and speech normal        Recent Labs     07/21/24  1722 07/21/24  1745 07/22/24  0814     --  138   K 4.0  --  4.0   CL 96  --  99   CO2 22  --  21   BUN 24*  --  32*   CREATININE 1.58* 1.8* 1.54*   GLUCOSE 130*  --  157*   CALCIUM 9.2  --  8.4*       Recent Labs     07/21/24  1715 07/22/24  0814   WBC 4.9 2.5*   RBC 5.51 5.15   HGB 16.5 15.0   HCT 46.9 44.1   MCV 85.1 85.6   MCH 29.9 29.1   MCHC 35.2 34.0   RDW 14.1 14.2   PLT 99* 112*       Radiology:   CT HEAD WO CONTRAST   Final Result   1. No acute intracranial abnormality.   2. Pansinus mucosal thickening.         CTA CHEST W WO CONTRAST   Final Result   1. No evidence of pulmonary embolism or acute pulmonary abnormality.   2. Mild emphysema and bronchitis.   3. Aortic and coronary calcification.         CT ABDOMEN PELVIS W IV CONTRAST Additional Contrast? None   Final Result   1. Small focal area of short segment diverticulitis/colitis at the splenic   flexure.   2. Mild ileus.   3. Mild fatty liver.   4. Small hiatal hernia.   5. Small fat filled umbilical and inguinal hernias.   6. Coronary artery disease.         XR CHEST PORTABLE   Final Result   No acute process.             Assessment/Plan:      Acute Problems:  COVID-19 with hypoxia on admission requring new 2L O2 by NC in ED. Now on RA  Probable short segment splenic flexure colitis/diverticultis  Weakness and mild confusion, likely due to the above  Thrombocytopenia, likely secondary to simultaneous acute infectious processes     Chronic Problems:   CKD stage 3A  CAD s/p PCI of distal left circ (2008), balloon angioplasty circumflex (2011), PCI of RCA (Feb 2024)  Refuses immunizations.    Often refuses outpatient blood work and follow up (per outpatient records in Care Everywhere)  Hard of hearing  HLD  GERD     Plan:  Admit to inpatient status.   Droplet plus precautions  Supplemental oxygen as needed to maintain goal SpO2 >92%.  Do not use supplemental oxygen unless

## 2024-07-22 NOTE — CARE COORDINATION
Case Management Assessment  Initial Evaluation    Date/Time of Evaluation: 7/22/2024 11:59 AM  Assessment Completed by: Lisa Mercado    If patient is discharged prior to next notation, then this note serves as note for discharge by case management.    Patient Name: Pavan Manley                   YOB: 1940  Diagnosis: Diverticulitis of colon [K57.32]  Acute respiratory failure with hypoxia (HCC) [J96.01]  Chest pain, unspecified type [R07.9]  Pneumonia due to COVID-19 virus [U07.1, J12.82]  COVID-19 [U07.1]                   Date / Time: 7/21/2024  4:44 PM    Patient Admission Status: Inpatient   Readmission Risk (Low < 19, Mod (19-27), High > 27): Readmission Risk Score: 12.7    Current PCP: No primary care provider on file.  PCP verified by CM? No ([facility] BUT DOES NOT KNOW PHYSICIAN NAME)    Chart Reviewed: Yes      History Provided by: Patient  Patient Orientation: Alert and Oriented, Person, Place, Situation    Patient Cognition: Alert    Hospitalization in the last 30 days (Readmission):  No    If yes, Readmission Assessment in CM Navigator will be completed.    Advance Directives:      Code Status: Full Code   Patient's Primary Decision Maker is: Patient Declined (Legal Next of Kin Remains as Decision Maker) (PATIENT REFUSED TO GIVE EMERGENCY CONTACT OR LEGAL NEXT OF KIN.)      Discharge Planning:    Patient lives with: Alone Type of Home: House  Primary Care Giver: Self  Patient Support Systems include: None   Current Financial resources:    Current community resources:    Current services prior to admission: None            Current DME:              Type of Home Care services:  None    ADLS  Prior functional level: Independent in ADLs/IADLs  Current functional level: Independent in ADLs/IADLs    PT AM-PAC: 20 /24  OT AM-PAC: 20 /24    Family can provide assistance at DC: No  Would you like Case Management to discuss the discharge plan with any other family  members/significant others, and if so, who? No  Plans to Return to Present Housing: Yes  Other Identified Issues/Barriers to RETURNING to current housing: N/A  Potential Assistance needed at discharge: N/A            Potential DME:    Patient expects to discharge to: House  Plan for transportation at discharge:      Financial    Payor: MEDICARE / Plan: MEDICARE PART A AND B / Product Type: *No Product type* /     Does insurance require precert for SNF: No    Potential assistance Purchasing Medications: No  Meds-to-Beds request: Yes      [facility] - [place], [place] - [address] [phone] - F [phone]  [address] [address]  Phone: [phone] Fax: [phone]    [email or web address] [address] - [place], [place] - [address] - P [phone] - F [phone]  [address] [facility] [address]  Phone: [phone] Fax: [phone]      Notes:    Factors facilitating achievement of predicted outcomes: Cooperative    Barriers to discharge: Medical complications    Additional Case Management Notes: MET WITH PATIENT TO DISCUSS DISCHARGE PLANNING. PATIENT LIVES HOME ALONE, NO DME, NO O2. PATIENT STATES IS A  AND [facility] BUT CANNOT RECALL DOCTOR'S NAME. [facility]. SPOKE WITH PATIENT REGARDING EMERGENCY CONTACT AND HPOA. PATIENT STATES HE HAS A NEIGHBOR THAT HELPS OUT, REFUSED TO LIST NEIGHBOR AS EMERGENCY CONTACT. PATIENT REFUSES NEXT OF KIN STATES HE HAS NO ONE.TO MAKE DECISIONS FOR HIM IN THE EVENT HE WAS UNABLE TO MAKE DECISIONS. PATIENT STATES DOES NOT HAVE HPOA AND STATES HE MAKES HIS OWN DECISIONS. PER PT RECOMMEND HHC-OFFERED HHC TO PATIENT - PATIENT REFUSES STATING \"I DON'T WANT ANYONE COMING TO MY HOUSE.  DISCHARGE PLAN. HOME NO NEEDS.    The Plan for Transition of Care is related to the following treatment goals of Diverticulitis of colon [K57.32]  Acute respiratory failure with hypoxia (HCC) [J96.01]  Chest pain, unspecified  type [R07.9]  Pneumonia due to COVID-19 virus [U07.1, J12.82]  COVID-19 [U07.1]    IF APPLICABLE: The Patient and/or patient representative Pavan and his family were provided with a choice of provider and agrees with the discharge plan. Freedom of choice list with basic dialogue that supports the patient's individualized plan of care/goals and shares the quality data associated with the providers was provided to:     Patient Representative Name:       The Patient and/or Patient Representative Agree with the Discharge Plan?      Lisa Mercado  Case Management Department

## 2024-07-22 NOTE — PLAN OF CARE
Problem: Discharge Planning  Goal: Discharge to home or other facility with appropriate resources  7/22/2024 1053 by Clarita Villatoro, RN  Outcome: Progressing    Flowsheets  Taken 7/21/2024 2256  Discharge to home or other facility with appropriate resources:   Identify barriers to discharge with patient and caregiver   Arrange for needed discharge resources and transportation as appropriate  Taken 7/21/2024 2223  Discharge to home or other facility with appropriate resources: Arrange for needed discharge resources and transportation as appropriate     Problem: Pain  Goal: Verbalizes/displays adequate comfort level or baseline comfort level  7/22/2024 1053 by Clarita Villatoro, RN    Outcome: Progressing  Flowsheets (Taken 7/21/2024 2245)  Verbalizes/displays adequate comfort level or baseline comfort level:   Implement non-pharmacological measures as appropriate and evaluate response   Encourage patient to monitor pain and request assistance     Problem: Safety - Adult  Goal: Free from fall injury  7/22/2024 1053 by Clarita Villatoro, RN  Outcome: Progressing

## 2024-07-22 NOTE — PROGRESS NOTES
Physical Therapy Med Surg Initial Assessment  Facility/Department: [address] ORTHO TELE  Room: [facility]/[place] [address]       NAME: Pavan Manley  : 1940 (84 y.o.)  MRN: [id number]  CODE STATUS: Full Code    Date of Service: 2024    Patient Diagnosis(es): Diverticulitis of colon [K57.32]  Acute respiratory failure with hypoxia (HCC) [J96.01]  Chest pain, unspecified type [R07.9]  Pneumonia due to COVID-19 virus [U07.1, J12.82]  COVID-19 [U07.1]   Chief Complaint   Patient presents with    Chest Pain     X3 weeks   Pain is getting worse     Patient Active Problem List    Diagnosis Date Noted    Pneumonia due to COVID-19 virus 2024        Past Medical History:   Diagnosis Date    CAD (coronary artery disease)     Hx PCI x 2, balloon angioplasty x1 (1910-0123).  Follows with Dr. Rodriguez.    CKD stage 3a, GFR 45-59 ml/min ([facility])     GERD (gastroesophageal reflux disease)     Hard of hearing     HLD (hyperlipidemia)     Immunization not carried out because of patient refusal     Refuses immunizations     Past Surgical History:   Procedure Laterality Date    CHOLECYSTECTOMY      CORONARY ANGIOPLASTY WITH STENT PLACEMENT Right 2    LEG SURGERY         Patient assessed for rehabilitation services?: Yes  Family / Caregiver Present: No    Restrictions:  Restrictions/Precautions: Fall Risk, Up as Tolerated, Isolation (Covid)     SUBJECTIVE:   Pain   Denies pain    Prior Level of Function:  Social/Functional History  Lives With: Alone  Type of Home: House  Home Layout: One level  Home Access: Stairs to enter with rails  Entrance Stairs - Number of Steps: 3  Bathroom Shower/Tub: Tub/Shower unit  Bathroom Equipment: Hand-held shower  Home Equipment: None  Has the patient had two or more falls in the past year or any fall with injury in the past year?: No  ADL Assistance: Independent  Homemaking Assistance: Independent  Ambulation Assistance: Independent (No AD)  Transfer Assistance: Independent  Active : Yes    OBJECTIVE:  Continued PT is required to progress pt to indep level for safe d/c home at [facility].  Requires PT Follow-Up: Yes       PLAN OF CARE:  Physical Therapy Plan  General Plan: 1 time a day 3-6 times a week  Current Treatment Recommendations: Strengthening, ROM, Balance training, Functional mobility training, Transfer training, Endurance training, Gait training, Stair training, Neuromuscular re-education, Home exercise program, Safety education & training, Patient/Caregiver education & training, Equipment evaluation, education, & procurement, Positioning, Therapeutic activities    Safety Devices  Type of Devices: All fall risk precautions in place, Call light within reach, Chair alarm in place, Left in chair    Goals:  Long Term Goals  Long Term Goal 1: Pt will demonstrate bed mobility indep  Long Term Goal 2: Pt will demonstrate transfers indep  Long Term Goal 3: Pt will demonstrate amb >/= 100ft indep  Long Term Goal 4: Pt will demonstrate TUG </= 19 sec for decreased risk for falls    AMPA (6 CLICK) BASIC MOBILITY  AM-PAC Inpatient Mobility Raw Score : 20     Therapy Time:   Individual   Time In 0932   Time Out 0942   Minutes 10       Eval x 10 min    Hanna Craft PT, 07/22/24 at 11:01 AM         Definitions for assistance levels  Independent = pt does not require any physical supervision or assistance from another person for activity completion. Device may be needed.  Stand by assistance = pt requires verbal cues or instructions from another person, close to but not touching, to perform the activity  Minimal assistance= pt performs 75% or more of the activity; assistance is required to complete the activity  Moderate assistance= pt performs 50% of the activity; assistance is required to complete the activity  Maximal assistance = pt performs 25% of the activity; assistance is required to complete the activity  Dependent = pt requires total physical assistance to accomplish the task

## 2024-07-22 NOTE — FLOWSHEET NOTE
Perfect serve sent to MD in regard to giving morning dose of Lovenox due to platelet count of 99. Ok to give per MD, will monitor.

## 2024-07-22 NOTE — PLAN OF CARE
Problem: Discharge Planning  Goal: Discharge to home or other facility with appropriate resources  Outcome: Progressing  Flowsheets  Taken 7/21/2024 2256  Discharge to home or other facility with appropriate resources:   Identify barriers to discharge with patient and caregiver   Arrange for needed discharge resources and transportation as appropriate  Taken 7/21/2024 2223  Discharge to home or other facility with appropriate resources: Arrange for needed discharge resources and transportation as appropriate     Problem: Pain  Goal: Verbalizes/displays adequate comfort level or baseline comfort level  Outcome: Progressing  Flowsheets (Taken 7/21/2024 2245)  Verbalizes/displays adequate comfort level or baseline comfort level:   Implement non-pharmacological measures as appropriate and evaluate response   Encourage patient to monitor pain and request assistance     Problem: Safety - Adult  Goal: Free from fall injury  Outcome: Progressing

## 2024-07-22 NOTE — ACP (ADVANCE CARE PLANNING)
Advance Care Planning     Advance Care Planning Activator (Inpatient)  Conversation Note      Date of ACP Conversation: 7/22/2024     Conversation Conducted with: Patient with Decision Making Capacity    ACP Activator: Lisa Mercado        Care Preferences    Ventilation:  \"If you were in your present state of health and suddenly became very ill and were unable to breathe on your own, what would your preference be about the use of a ventilator (breathing machine) if it were available to you?\"      Would the patient desire the use of ventilator (breathing machine)?: yes    \"If your health worsens and it becomes clear that your chance of recovery is unlikely, what would your preference be about the use of a ventilator (breathing machine) if it were available to you?\"     Would the patient desire the use of ventilator (breathing machine)?: Yes      Resuscitation  \"CPR works best to restart the heart when there is a sudden event, like a heart attack, in someone who is otherwise healthy. Unfortunately, CPR does not typically restart the heart for people who have serious health conditions or who are very sick.\"    \"In the event your heart stopped as a result of an underlying serious health condition, would you want attempts to be made to restart your heart (answer \"yes\" for attempt to resuscitate) or would you prefer a natural death (answer \"no\" for do not attempt to resuscitate)?\" yes       [x] Yes   [] No   Educated Patient / Decision Maker regarding differences between Advance Directives and portable DNR orders.    Length of ACP Conversation in minutes:      Conversation Outcomes:  ACP discussion completed    Follow-up plan:    [] Schedule follow-up conversation to continue planning  [] Referred individual to Provider for additional questions/concerns   [x] Advised patient/agent/surrogate to review completed ACP document and update if needed with changes in condition, patient preferences or care setting    [] This  note routed to one or more involved healthcare providers

## 2024-07-23 LAB
ALBUMIN SERPL-MCNC: 3.6 G/DL (ref 3.5–4.6)
ALP SERPL-CCNC: 61 U/L (ref 35–104)
ALT SERPL-CCNC: 21 U/L (ref 0–41)
ANION GAP SERPL CALCULATED.3IONS-SCNC: 13 MEQ/L (ref 9–15)
AST SERPL-CCNC: 28 U/L (ref 0–40)
BASOPHILS # BLD: 0 K/UL (ref 0–0.2)
BASOPHILS NFR BLD: 0.1 %
BILIRUB DIRECT SERPL-MCNC: <0.2 MG/DL (ref 0–0.4)
BILIRUB INDIRECT SERPL-MCNC: NORMAL MG/DL (ref 0–0.6)
BILIRUB SERPL-MCNC: 0.5 MG/DL (ref 0.2–0.7)
BUN SERPL-MCNC: 41 MG/DL (ref 8–23)
CALCIUM SERPL-MCNC: 8.3 MG/DL (ref 8.5–9.9)
CHLORIDE SERPL-SCNC: 101 MEQ/L (ref 95–107)
CO2 SERPL-SCNC: 23 MEQ/L (ref 20–31)
CREAT SERPL-MCNC: 1.58 MG/DL (ref 0.7–1.2)
CRP SERPL HS-MCNC: 75.5 MG/L (ref 0–5)
EOSINOPHIL # BLD: 0 K/UL (ref 0–0.7)
EOSINOPHIL NFR BLD: 0.1 %
ERYTHROCYTE [DISTWIDTH] IN BLOOD BY AUTOMATED COUNT: 14.1 % (ref 11.5–14.5)
GLUCOSE SERPL-MCNC: 142 MG/DL (ref 70–99)
HCT VFR BLD AUTO: 40.5 % (ref 42–52)
HGB BLD-MCNC: 14.1 G/DL (ref 14–18)
LYMPHOCYTES # BLD: 0.6 K/UL (ref 1–4.8)
LYMPHOCYTES NFR BLD: 8.3 %
MCH RBC QN AUTO: 29.6 PG (ref 27–31.3)
MCHC RBC AUTO-ENTMCNC: 34.8 % (ref 33–37)
MCV RBC AUTO: 84.9 FL (ref 79–92.2)
MONOCYTES # BLD: 2.8 K/UL (ref 0.2–0.8)
MONOCYTES NFR BLD: 38.7 %
NEUTROPHILS # BLD: 3.7 K/UL (ref 1.4–6.5)
NEUTS SEG NFR BLD: 50.5 %
PLATELET # BLD AUTO: 143 K/UL (ref 130–400)
POTASSIUM SERPL-SCNC: 3.8 MEQ/L (ref 3.4–4.9)
PROT SERPL-MCNC: 6.6 G/DL (ref 6.3–8)
RBC # BLD AUTO: 4.77 M/UL (ref 4.7–6.1)
SODIUM SERPL-SCNC: 137 MEQ/L (ref 135–144)
VITAMIN D 25-HYDROXY: 19.3 NG/ML (ref 30–100)
WBC # BLD AUTO: 7.2 K/UL (ref 4.8–10.8)

## 2024-07-23 PROCEDURE — 99232 SBSQ HOSP IP/OBS MODERATE 35: CPT | Performed by: INTERNAL MEDICINE

## 2024-07-23 PROCEDURE — 80076 HEPATIC FUNCTION PANEL: CPT

## 2024-07-23 PROCEDURE — 6360000002 HC RX W HCPCS: Performed by: INTERNAL MEDICINE

## 2024-07-23 PROCEDURE — 1210000000 HC MED SURG R&B

## 2024-07-23 PROCEDURE — 2580000003 HC RX 258: Performed by: INTERNAL MEDICINE

## 2024-07-23 PROCEDURE — 80048 BASIC METABOLIC PNL TOTAL CA: CPT

## 2024-07-23 PROCEDURE — 36415 COLL VENOUS BLD VENIPUNCTURE: CPT

## 2024-07-23 PROCEDURE — 85025 COMPLETE CBC W/AUTO DIFF WBC: CPT

## 2024-07-23 PROCEDURE — 86140 C-REACTIVE PROTEIN: CPT

## 2024-07-23 PROCEDURE — 6370000000 HC RX 637 (ALT 250 FOR IP): Performed by: INTERNAL MEDICINE

## 2024-07-23 RX ADMIN — ACETAMINOPHEN 325MG 650 MG: 325 TABLET ORAL at 19:57

## 2024-07-23 RX ADMIN — GUAIFENESIN SYRUP AND DEXTROMETHORPHAN 5 ML: 100; 10 SYRUP ORAL at 19:57

## 2024-07-23 RX ADMIN — ENOXAPARIN SODIUM 40 MG: 100 INJECTION SUBCUTANEOUS at 08:50

## 2024-07-23 RX ADMIN — SODIUM CHLORIDE, PRESERVATIVE FREE 10 ML: 5 INJECTION INTRAVENOUS at 08:50

## 2024-07-23 RX ADMIN — REMDESIVIR 100 MG: 100 INJECTION, POWDER, LYOPHILIZED, FOR SOLUTION INTRAVENOUS at 20:04

## 2024-07-23 RX ADMIN — ASPIRIN 81 MG: 81 TABLET, COATED ORAL at 08:50

## 2024-07-23 RX ADMIN — DEXAMETHASONE SODIUM PHOSPHATE 6 MG: 4 INJECTION INTRA-ARTICULAR; INTRALESIONAL; INTRAMUSCULAR; INTRAVENOUS; SOFT TISSUE at 08:50

## 2024-07-23 RX ADMIN — PIPERACILLIN AND TAZOBACTAM 3375 MG: 3; .375 INJECTION, POWDER, LYOPHILIZED, FOR SOLUTION INTRAVENOUS at 04:51

## 2024-07-23 RX ADMIN — PIPERACILLIN AND TAZOBACTAM 3375 MG: 3; .375 INJECTION, POWDER, LYOPHILIZED, FOR SOLUTION INTRAVENOUS at 20:54

## 2024-07-23 RX ADMIN — PIPERACILLIN AND TAZOBACTAM 3375 MG: 3; .375 INJECTION, POWDER, LYOPHILIZED, FOR SOLUTION INTRAVENOUS at 13:26

## 2024-07-23 ASSESSMENT — ENCOUNTER SYMPTOMS
SHORTNESS OF BREATH: 1
GASTROINTESTINAL NEGATIVE: 1
COUGH: 1
STRIDOR: 0

## 2024-07-23 ASSESSMENT — PAIN SCALES - GENERAL: PAINLEVEL_OUTOF10: 3

## 2024-07-23 NOTE — PROGRESS NOTES
[facility] Hospitalist Progress Note    Admitting Date and Time: 7/21/2024  4:44 PM  Admit Dx: Diverticulitis of colon [K57.32]  Acute respiratory failure with hypoxia (HCC) [J96.01]  Chest pain, unspecified type [R07.9]  Pneumonia due to COVID-19 virus [U07.1, J12.82]  COVID-19 [U07.1]    Subjective:    No acute events overnight. During my evaluation patient was on RA but did report new lightheadedness and significantly worsened generalized weakness compared to yesterday. After discussing with patient he did not feel safe going home in his current state      ROS: denies fever, chills, cp, sob, n/v, HA unless stated above.       dexAMETHasone  6 mg IntraVENous Q24H    piperacillin-tazobactam  3,375 mg IntraVENous Q8H    sodium chloride flush  5-40 mL IntraVENous 2 times per day    enoxaparin  40 mg SubCUTAneous Daily    aspirin  81 mg Oral Daily    remdesivir 100 mg in sodium chloride 0.9 % 250 mL IVPB  100 mg IntraVENous Q24H     sodium chloride flush, 5-40 mL, PRN  sodium chloride, , PRN  ondansetron, 4 mg, Q8H PRN   Or  ondansetron, 4 mg, Q6H PRN  polyethylene glycol, 17 g, Daily PRN  acetaminophen, 650 mg, Q6H PRN   Or  acetaminophen, 650 mg, Q6H PRN  guaiFENesin-dextromethorphan, 5 mL, Q4H PRN         Objective:    BP (!) 115/59   Pulse 67   Temp 97.9 °F (36.6 °C) (Oral)   Resp 18   Ht 1.727 m (5' 8\")   Wt 85.3 kg (188 lb)   SpO2 97%   BMI 28.59 kg/m²     General Appearance: alert and oriented to person, place and time and in no acute distress  Skin: warm and dry  Head: normocephalic and atraumatic  Eyes: pupils equal, round, and reactive to light, extraocular eye movements intact, conjunctivae normal  Neck: neck supple and non tender without mass   Pulmonary/Chest: clear to auscultation bilaterally- no wheezes, rales or rhonchi, normal air movement, no respiratory distress  Cardiovascular: normal rate, normal S1 and S2 and no carotid bruits  Abdomen: soft, non-tender, non-distended, normal  records in Care Everywhere)  Hard of hearing  HLD  GERD     Plan:  Admit to inpatient status. Patient is high risk for progression of disease given age/comorbidities and will stay admitted tonight given setback of new lightheadedness/weakness reported today  Droplet plus precautions  Supplemental oxygen as needed to maintain goal SpO2 >92%.  Do not use supplemental oxygen unless actually hypoxic.  Decadron 6 mg daily for 10 days total  Pharmacy consult for remdesivir  Infectious disease consult   Empiric ciprofloxacin and metronidazole x 1 week for short segment colitis/diverticulitis  Continue home antiplatelet therapy in setting of recent PCI February 2024  Lovenox for DVT prophylaxis  Fall precautions  PT/OT evaluate and treat           Electronically signed by Melvin Pineda MD on 7/23/2024 at 12:50 PM

## 2024-07-23 NOTE — PROGRESS NOTES
Infectious Disease     Patient Name: Pavan Manley  Date: 7/23/2024  YOB: 1940  Medical Record Number: [id number]        COVID-19 with hypoxia  Diverticulitis        Shortness of breath productive cough weakness fatigue loss of appetite loss of sense of taste headache ongoing 2 to 3 weeks possibly  Headache worsening cough worsening some abdominal discomfort    Mildly positive COVID-19 per records she is not been vaccinated  Saturation is 88% in the emergency department placed on supplemental O2 2 L      CT scan abdomen small area of inflammation around the splenic flexure possible diverticulitis versus colitis per CT    CT scan of chest no PE few scattered patchy infiltrates   Chest x-ray shows no infiltrates      Leukopenic white count 2.5 ANC of 1  Procalcitonin 1.71    Urinalysis negative    Now on room air            Review of Systems   Constitutional:  Positive for fatigue and fever.   Respiratory:  Positive for cough and shortness of breath. Negative for stridor.    Cardiovascular: Negative.    Gastrointestinal: Negative.    Skin: Negative.        Physical Exam:      Physical Exam  Cardiovascular:      Heart sounds: Normal heart sounds. No murmur heard.  Pulmonary:      Effort: Pulmonary effort is normal. No respiratory distress.      Breath sounds: Normal breath sounds. No stridor. No wheezing, rhonchi or rales.   Chest:      Chest wall: No tenderness.   Abdominal:      General: Abdomen is flat. Bowel sounds are normal. There is no distension.      Palpations: There is no mass.      Tenderness: There is no abdominal tenderness. There is no right CVA tenderness, left CVA tenderness, guarding or rebound.      Hernia: No hernia is present.         Blood pressure (!) 115/59, pulse 67, temperature 97.9 °F (36.6 °C), temperature source Oral, resp. rate 18, height 1.727 m (5' 8\"), weight 85.3 kg (188 lb), SpO2 97 %.      .   Lab Results   Component Value Date    WBC 7.2 07/23/2024    HGB 14.1  filled inguinal hernias.  Coronary artery disease.  Small hiatal hernia.  Lung bases grossly are unremarkable allowing for the motion artifact.  No  pneumoperitoneum.  Cholecystectomy.  Mild fatty liver.  Solid organs  otherwise negative for acute process.  Nonobstructive bowel gas pattern.  Mild ileus.  Normal appendix.  Diverticulosis.  Small focal area of short  segment diverticulitis/colitis at the splenic flexure best demonstrated on  axial image 53 and coronal image 50.  ASVD.  No ascites or bulky  lymphadenopathy.  Unremarkable pelvic structures.  No acute osseous findings  or destructive bone lesions.  Chronic pelvic trauma.     IMPRESSION:  1. Small focal area of short segment diverticulitis/colitis at the splenic  flexure.  2. Mild ileus.  3. Mild fatty liver.  4. Small hiatal hernia.  5. Small fat filled umbilical and inguinal hernias.  6. Coronary artery disease.              Specimen Collected: 07/21/24 20:48 EDT Last Resulted: 07/21/24 20:54 EDT               CTA CHEST W WO CONTRAST [[id number]]  Status: Final result     PACS Images     Show images for CTA CHEST W WO CONTRAST  CTA CHEST W WO CONTRAST  Order: [id number]  Status: Final result       Visible to patient: No (not released)       Next appt: None    0 Result Notes  Details    Reading Physician Reading Date Result Priority   Ishmael Goins MD  [phone] 7/21/2024      Narrative & Impression  EXAMINATION:  CTA OF THE CHEST WITH AND WITHOUT CONTRAST 7/21/2024 6:29 pm     TECHNIQUE:  CTA of the chest was performed before and after the administration of  intravenous contrast.  Multiplanar reformatted images are provided for  review.  MIP images are provided for review. Automated exposure control,  iterative reconstruction, and/or weight based adjustment of the mA/kV was  utilized to reduce the radiation dose to as low as reasonably achievable.     COMPARISON:  None.     HISTORY:  ORDERING SYSTEM PROVIDED HISTORY: Rule out PE  TECHNOLOGIST

## 2024-07-23 NOTE — PLAN OF CARE
Problem: Discharge Planning  Goal: Discharge to home or other facility with appropriate resources  7/23/2024 1053 by Clarita Villatoro, RN  Outcome: Progressing    Problem: Pain  Goal: Verbalizes/displays adequate comfort level or baseline comfort level  7/23/2024 1053 by Clarita Villatoro, RN  Outcome: Progressing       Problem: Safety - Adult  Goal: Free from fall injury  7/23/2024 1053 by Clarita Villatoro, RN  Outcome: Progressing

## 2024-07-24 VITALS
BODY MASS INDEX: 28.49 KG/M2 | TEMPERATURE: 98.1 F | SYSTOLIC BLOOD PRESSURE: 122 MMHG | RESPIRATION RATE: 18 BRPM | HEIGHT: 68 IN | OXYGEN SATURATION: 94 % | HEART RATE: 52 BPM | WEIGHT: 188 LBS | DIASTOLIC BLOOD PRESSURE: 52 MMHG

## 2024-07-24 LAB
ALBUMIN SERPL-MCNC: 3.3 G/DL (ref 3.5–4.6)
ALP SERPL-CCNC: 53 U/L (ref 35–104)
ALT SERPL-CCNC: 16 U/L (ref 0–41)
ANION GAP SERPL CALCULATED.3IONS-SCNC: 12 MEQ/L (ref 9–15)
AST SERPL-CCNC: 23 U/L (ref 0–40)
BASOPHILS # BLD: 0 K/UL (ref 0–0.2)
BASOPHILS NFR BLD: 0.1 %
BILIRUB DIRECT SERPL-MCNC: <0.2 MG/DL (ref 0–0.4)
BILIRUB INDIRECT SERPL-MCNC: ABNORMAL MG/DL (ref 0–0.6)
BILIRUB SERPL-MCNC: 0.5 MG/DL (ref 0.2–0.7)
BUN SERPL-MCNC: 32 MG/DL (ref 8–23)
CALCIUM SERPL-MCNC: 8.1 MG/DL (ref 8.5–9.9)
CHLORIDE SERPL-SCNC: 104 MEQ/L (ref 95–107)
CO2 SERPL-SCNC: 21 MEQ/L (ref 20–31)
CREAT SERPL-MCNC: 1.16 MG/DL (ref 0.7–1.2)
CRP SERPL HS-MCNC: 42.9 MG/L (ref 0–5)
EOSINOPHIL # BLD: 0 K/UL (ref 0–0.7)
EOSINOPHIL NFR BLD: 0 %
ERYTHROCYTE [DISTWIDTH] IN BLOOD BY AUTOMATED COUNT: 14.3 % (ref 11.5–14.5)
GLUCOSE SERPL-MCNC: 117 MG/DL (ref 70–99)
HCT VFR BLD AUTO: 39.9 % (ref 42–52)
HGB BLD-MCNC: 13.9 G/DL (ref 14–18)
LYMPHOCYTES # BLD: 0.8 K/UL (ref 1–4.8)
LYMPHOCYTES NFR BLD: 11.9 %
MCH RBC QN AUTO: 29.6 PG (ref 27–31.3)
MCHC RBC AUTO-ENTMCNC: 34.8 % (ref 33–37)
MCV RBC AUTO: 84.9 FL (ref 79–92.2)
MONOCYTES # BLD: 2.3 K/UL (ref 0.2–0.8)
MONOCYTES NFR BLD: 33 %
NEUTROPHILS # BLD: 3.6 K/UL (ref 1.4–6.5)
NEUTS SEG NFR BLD: 52.2 %
PLATELET # BLD AUTO: 141 K/UL (ref 130–400)
POTASSIUM SERPL-SCNC: 3.6 MEQ/L (ref 3.4–4.9)
PROT SERPL-MCNC: 6 G/DL (ref 6.3–8)
RBC # BLD AUTO: 4.7 M/UL (ref 4.7–6.1)
SODIUM SERPL-SCNC: 137 MEQ/L (ref 135–144)
WBC # BLD AUTO: 6.8 K/UL (ref 4.8–10.8)

## 2024-07-24 PROCEDURE — 80076 HEPATIC FUNCTION PANEL: CPT

## 2024-07-24 PROCEDURE — 2580000003 HC RX 258: Performed by: INTERNAL MEDICINE

## 2024-07-24 PROCEDURE — 86140 C-REACTIVE PROTEIN: CPT

## 2024-07-24 PROCEDURE — 80048 BASIC METABOLIC PNL TOTAL CA: CPT

## 2024-07-24 PROCEDURE — 6360000002 HC RX W HCPCS: Performed by: INTERNAL MEDICINE

## 2024-07-24 PROCEDURE — 6370000000 HC RX 637 (ALT 250 FOR IP): Performed by: INTERNAL MEDICINE

## 2024-07-24 PROCEDURE — 36415 COLL VENOUS BLD VENIPUNCTURE: CPT

## 2024-07-24 PROCEDURE — 85025 COMPLETE CBC W/AUTO DIFF WBC: CPT

## 2024-07-24 RX ORDER — AMOXICILLIN AND CLAVULANATE POTASSIUM 875; 125 MG/1; MG/1
1 TABLET, FILM COATED ORAL 2 TIMES DAILY
Qty: 5 TABLET | Refills: 0 | Status: SHIPPED | OUTPATIENT
Start: 2024-07-24 | End: 2024-07-27

## 2024-07-24 RX ORDER — DEXAMETHASONE 6 MG/1
6 TABLET ORAL DAILY
Qty: 6 TABLET | Refills: 0 | Status: SHIPPED | OUTPATIENT
Start: 2024-07-24 | End: 2024-07-30

## 2024-07-24 RX ADMIN — DEXAMETHASONE SODIUM PHOSPHATE 6 MG: 4 INJECTION INTRA-ARTICULAR; INTRALESIONAL; INTRAMUSCULAR; INTRAVENOUS; SOFT TISSUE at 08:35

## 2024-07-24 RX ADMIN — SODIUM CHLORIDE, PRESERVATIVE FREE 10 ML: 5 INJECTION INTRAVENOUS at 08:35

## 2024-07-24 RX ADMIN — ASPIRIN 81 MG: 81 TABLET, COATED ORAL at 08:35

## 2024-07-24 RX ADMIN — PIPERACILLIN AND TAZOBACTAM 3375 MG: 3; .375 INJECTION, POWDER, LYOPHILIZED, FOR SOLUTION INTRAVENOUS at 05:07

## 2024-07-24 RX ADMIN — ENOXAPARIN SODIUM 40 MG: 100 INJECTION SUBCUTANEOUS at 08:35

## 2024-07-24 NOTE — PROGRESS NOTES
CLINICAL PHARMACY NOTE: MEDS TO BEDS    Total # of Prescriptions Filled: 2   The following medications were delivered to the patient:  Dexamethasone 6 mg tab  Amoxicillin-POT-Clav 875/125 mg tab    Additional Documentation:

## 2024-07-24 NOTE — DISCHARGE SUMMARY
Hospital Medicine Discharge Summary    Pavan Manley  :  1940  MRN:  [id number]    Admit date:  2024  Discharge date:  2024    Admitting Physician:  Agnieszka Macias DO  Primary Care Physician:  No primary care provider on file.      Chief Complaint   Patient presents with    Chest Pain     X3 weeks   Pain is getting worse     Hospital Course:     83 yo M who presented with dyspnea and weakness and found to be + for Covid-19. CT imaging on admission also showed evidence of short segment diverticulitis as splenic flexure. He initially required 2 L supplemental O2 but gradually improved to room air with decadron and remdesivir treatment. ID followed for any adjunctive antimicrobial recommendations and started him on Zosyn for diverticulitis. By time of discharge patient was ambulating well and had no acute complaints. PO abx and remainder of decadron course was delivered to his bedside. PT/OT recommended HHC but patient declined.       Exam on discharge:    BP (!) 122/52   Pulse 52   Temp 98.1 °F (36.7 °C) (Oral)   Resp 18   Ht 1.727 m (5' 8\")   Wt 85.3 kg (188 lb)   SpO2 94%   BMI 28.59 kg/m²   General appearance: No apparent distress, appears stated age and cooperative.  HEENT: Pupils equal, round, and reactive to light. Conjunctivae/corneas clear.  Neck: Supple, with full range of motion. No jugular venous distention. Trachea midline.  Respiratory:  Normal respiratory effort. Clear to auscultation, bilaterally without Rales/Wheezes/Rhonchi.  Cardiovascular: Regular rate and rhythm with normal S1/S2 without murmurs, rubs or gallops.  Abdomen: Soft, non-tender, non-distended with normal bowel sounds.  Musculoskeletal: No clubbing, cyanosis or edema bilaterally.  Full range of motion without deformity.  Skin: Skin color, texture, turgor normal.  No rashes or lesions.  Neuro: Non Focal. Symetrical motor and tone. Nl Comprehension, Alert,awake and oriented. NL CN. Symetrical tone and       Medication List        START taking these medications      amoxicillin-clavulanate 875-125 MG per tablet  Commonly known as: AUGMENTIN  Take 1 tablet by mouth 2 times daily for 5 doses     dexAMETHasone 6 MG tablet  Commonly known as: DECADRON  Take 1 tablet by mouth daily for 6 days     omeprazole 40 MG delayed release capsule  Commonly known as: PriLOSEC  Take 1 capsule by mouth daily            CONTINUE taking these medications      albuterol sulfate  (90 Base) MCG/ACT inhaler  Commonly known as: Ventolin HFA  Inhale 2 puffs into the lungs 4 times daily as needed for Wheezing     aspirin 81 MG tablet            ASK your doctor about these medications      pravastatin 40 MG tablet  Commonly known as: PRAVACHOL               Where to Get Your Medications        These medications were sent to [facility] - [place], [place] - [address] - P [phone] - F [phone]  [address] [place] [address] [place] [address]      Phone: [phone]   amoxicillin-clavulanate 875-125 MG per tablet  dexAMETHasone 6 MG tablet         Disposition:   If discharged to Home, Any [facility] needs that were indicated and/or required as been addressed and set up by Social Work.     Condition at discharge: good     Activity: activity as tolerated    Total time taken for discharging this patient: 40 minutes. Greater than 70% of time was spent focused exclusively on this patient. Time was taken to review chart, discuss plans with consultants, reconciling medications, discussing plan answering questions with patient.     Signed:  Melvin Pineda MD  7/24/2024, 1:10 PM  ----------------------------------------------------------------------------------------------------------------------    Pavan Manley

## 2024-07-24 NOTE — PLAN OF CARE
Problem: Discharge Planning  Goal: Discharge to home or other facility with appropriate resources  7/24/2024 0930 by Sheela Rangel RN  Outcome: Progressing  Flowsheets (Taken 7/24/2024 0800)  Discharge to home or other facility with appropriate resources:   Identify barriers to discharge with patient and caregiver   Arrange for needed discharge resources and transportation as appropriate   Arrange for interpreters to assist at discharge as needed   Identify discharge learning needs (meds, wound care, etc)   Refer to discharge planning if patient needs post-hospital services based on physician order or complex needs related to functional status, cognitive ability or social support system  7/23/2024 2227 by Alycia Francis RN  Outcome: Progressing     Problem: Pain  Goal: Verbalizes/displays adequate comfort level or baseline comfort level  7/24/2024 0930 by Sheela Rangel RN  Outcome: Progressing  Flowsheets (Taken 7/24/2024 0738)  Verbalizes/displays adequate comfort level or baseline comfort level:   Encourage patient to monitor pain and request assistance   Assess pain using appropriate pain scale   Administer analgesics based on type and severity of pain and evaluate response   Implement non-pharmacological measures as appropriate and evaluate response   Consider cultural and social influences on pain and pain management   Notify Licensed Independent Practitioner if interventions unsuccessful or patient reports new pain  7/23/2024 2227 by Alycia Francis RN  Outcome: Progressing     Problem: Safety - Adult  Goal: Free from fall injury  7/24/2024 0930 by Sheela Rangel RN  Outcome: Progressing  7/23/2024 2227 by Alycia Francis, RN  Outcome: Progressing

## 2024-07-24 NOTE — DISCHARGE INSTRUCTIONS
-you have two new prescriptions that were given to you: dexamethasone and amoxicillin-clavulante.       -Dexamethasone is a steroid used to treat Covid-19. You already received a dose today, Wednesday 7/24 therefore you do not need to take a dose until tomorrow, 7/25      -You received a dose of antibiotic this morning in the hospital, but you still need to take a dose tonight. Please start your amoxicillin-clavulanate tonight, 7/24

## 2024-08-01 NOTE — PROGRESS NOTES
Physician Progress Note      PATIENT:               IRWIN JONES  CSN #:                  [id number]  :                       1940  ADMIT DATE:       2024 4:44 PM  DISCH DATE:        2024 12:45 PM  RESPONDING  PROVIDER #:        Melvin Pineda MD          QUERY TEXT:    Patient admitted with \"dyspnea and weakness and found to be + for Covid-19\".   Documentation reflects \"Acute respiratory failure with hypoxia (HCC) [J96.01]\"   in attending note(s) dated  and 24.  If possible, please document in   the progress notes and discharge summary if *Acute respiratory failure with   hypoxia was:    The medical record reflects the following:  Risk Factors: male age 84  HTN  CKD3a  Clinical Indicators:  ED: \"Pt generally ill-appearing, tachypneic, and borderline hypoxic Found to   be hypoxic in the ED with intermittent episodes of desaturations to 88 and 89%   while just laying in the bed.  Was going to perform walking pulse oximetry,   but already hypoxic in the setting of active COVID infection.\"  H&P \"Workup in the ED is significant for positive COVID-19 status.  He is   found to have hypoxia as low as 88% at rest in the ED, for which she is   started on 2 L supplemental oxygen with normalization of SpO2 and improvement   in subjective dyspnea\"   Dr Pineda: \"Acute respiratory failure with hypoxia (HCC) [J96.01]     COVID-19 with hypoxia on admission requring new 2L O2 by NC in ED. Now on RA\"  Treatment: CTA chest  CXR  IV Decadron  IV Zosyn  IV Remdesivir  ID    Cesar Dee RN CCDS  [phone]  Options provided:  -- Acute respiratory failure with hypoxia confirmed after study and as   evidenced by, please provide evidence.  -- Acute respiratory failure with hypoxia ruled out after study.  -- Other - I will add my own diagnosis  -- Disagree - Not applicable / Not valid  -- Disagree - Clinically unable to determine / Unknown  -- Refer to Clinical Documentation Reviewer    PROVIDER RESPONSE

## 2024-08-06 NOTE — PROGRESS NOTES
Physician Progress Note      PATIENT:               IRWIN JONES  CSN #:                  [id number]  :                       1940  ADMIT DATE:       2024 4:44 PM  DISCH DATE:        2024 12:45 PM  RESPONDING  PROVIDER #:        Melvin Pineda MD          QUERY TEXT:    Patient was admitted with COVID-19, diverticulitis treated with IV Zosyn, and   acute respiratory failure with hypoxia. Noted within 24 hours of admission WBC   4.9  2.5  PCT 0.17  .8. Based on clinical indicators and treatment,   can the patient's condition be further specified as:    The medical record reflects the following:  Risk Factors: male age 84  HTN  CKD3a:  Clinical Indicators: acute respiratory failure with hypoxia   within 24 hours   of admission WBC 4.9  2.5  PCT 0.17  .8  DS Dr Pineda: \"85 yo M who presented with dyspnea and weakness and found to   be + for Covid-19. CT imaging on admission also showed evidence of short   segment diverticulitis as splenic flexure. He initially required 2 L   supplemental O2 but gradually improved to room air with decadron and   remdesivir treatment. ID followed for any adjunctive antimicrobial   recommendations and started him on Zosyn for diverticulitis. By time of   discharge patient was ambulating well and had no acute complaints.\"  Treatment: CTA chest  CT abdomen pelvis  CXR  O2  IV Decadron  IV Zosyn  IV   Remdesivir  ID    Cesar Dee RN CCDS  [phone]  Options provided:  -- This patient has sepsis secondary to COVID-19 which was present on   admission.  -- This patient has COVID-19 and diverticulitis without sepsis.  -- Other - I will add my own diagnosis  -- Disagree - Not applicable / Not valid  -- Disagree - Clinically unable to determine / Unknown  -- Refer to Clinical Documentation Reviewer    PROVIDER RESPONSE TEXT:    This patient has sepsis secondary to COVID-19 which was present on admission.    Query created by: Cesar Dee on 2024 9:20

## 2024-08-15 ENCOUNTER — APPOINTMENT (OUTPATIENT)
Dept: CARDIOLOGY | Facility: CLINIC | Age: 84
End: 2024-08-15
Payer: MEDICARE

## 2025-02-12 ENCOUNTER — APPOINTMENT (OUTPATIENT)
Dept: CARDIOLOGY | Facility: CLINIC | Age: 85
End: 2025-02-12
Payer: MEDICARE

## 2025-02-12 VITALS
WEIGHT: 190.2 LBS | BODY MASS INDEX: 28.17 KG/M2 | HEART RATE: 74 BPM | SYSTOLIC BLOOD PRESSURE: 138 MMHG | DIASTOLIC BLOOD PRESSURE: 68 MMHG | HEIGHT: 69 IN

## 2025-02-12 DIAGNOSIS — I25.10 CAD S/P PERCUTANEOUS CORONARY ANGIOPLASTY: ICD-10-CM

## 2025-02-12 DIAGNOSIS — Z91.148 NON COMPLIANCE W MEDICATION REGIMEN: ICD-10-CM

## 2025-02-12 DIAGNOSIS — Z98.61 CAD S/P PERCUTANEOUS CORONARY ANGIOPLASTY: ICD-10-CM

## 2025-02-12 DIAGNOSIS — N18.2 STAGE 2 CHRONIC KIDNEY DISEASE: ICD-10-CM

## 2025-02-12 DIAGNOSIS — H91.90 HARD OF HEARING: ICD-10-CM

## 2025-02-12 DIAGNOSIS — Z95.5 STENTED CORONARY ARTERY: ICD-10-CM

## 2025-02-12 PROBLEM — N18.31 STAGE 3A CHRONIC KIDNEY DISEASE (MULTI): Status: RESOLVED | Noted: 2024-02-15 | Resolved: 2025-02-12

## 2025-02-12 PROCEDURE — G2211 COMPLEX E/M VISIT ADD ON: HCPCS | Performed by: INTERNAL MEDICINE

## 2025-02-12 PROCEDURE — 1159F MED LIST DOCD IN RCRD: CPT | Performed by: INTERNAL MEDICINE

## 2025-02-12 PROCEDURE — 1160F RVW MEDS BY RX/DR IN RCRD: CPT | Performed by: INTERNAL MEDICINE

## 2025-02-12 PROCEDURE — 99213 OFFICE O/P EST LOW 20 MIN: CPT | Performed by: INTERNAL MEDICINE

## 2025-02-12 NOTE — PATIENT INSTRUCTIONS
Continue same medications and treatments.     Please bring any lab results from other providers / physicians to your next appointment.     Please bring all medicines, vitamins, and herbal supplements with you when you come to the office.     Prescriptions will not be filled unless you are compliant with your follow up appointments or have a follow up appointment scheduled as per instruction of your physician. Refills should be requested at the time of your visit.      DID YOU KNOW:  We have a pharmacy here in the [facility].  They can fill all prescriptions, not just cardiac medications.  Prescriptions from other pharmacies can easily be transferred to the  pharmacy by the  pharmacist on site.   pharmacies offer FREE HOME DELIVERY on medications to anywhere in [place]. They can sync your medications. Typically prescriptions can be ready in 10 - 15 minutes. If pharmacy is unable to fill your  prescription or if cost is more than your paying now the Pharmacist can easily transfer back to your Pharmacy of choice. Pharmacy phone # [phone].     Scribe Attestation  By signing my name below, ILiang LPN , [place]   attest that this documentation has been prepared under the direction and in the presence of Ariana Juarez MD.

## 2025-02-12 NOTE — PROGRESS NOTES
"1 year follow-up visit.    Subjective :   Extremely hard of hearing.  Interval review of systems is negative for chest discomfort pressure tightness heaviness palpitations lightheadedness orthopnea paroxysmal nocturnal dyspnea dependent edema or claudication TIA or CVA type symptoms or bleeding diathesis  His only medication is baby aspirin, says he has been feeling great, does not want to take any other medication.      12 point ROS is negative or non contributory except as noted.   History so Far :  1. Atherosclerotic native vessel coronary artery disease with  percutaneous coronary intervention and stenting of the distal AV  groove left circumflex artery with a 2.75 x 13 mm Cypher drug-eluting  stent in the setting of acute coronary syndrome in 2008.  2. Balloon angioplasty of subtotally occluded circumflex in January 2011 at which time, stenting was not performed.  3. Dyslipidemia.  4. Refuses immunizations  5. Preserved LV systolic function.  6. Body mass index is slightly elevated and puts him in the overweight category, but he follows a very active heart healthy lifestyle and no major dietary modification is warranted  7. The patient is staying active.  8. The patient has consistently refused Prevnar despite  understanding its value.  9'. CKD stage 3,GFR 55 10/18. No more recent lab data available and patient refuses to get blood work  10. Hard of hearing to the point of deafness      Objective   Wt Readings from Last 3 Encounters:   02/12/25 86.3 kg (190 lb 3.2 oz)   02/15/24 86.6 kg (191 lb)   02/09/24 86 kg (189 lb 9.5 oz)            Vitals:    02/12/25 0833   BP: 138/68   BP Location: Left arm   Patient Position: Sitting   Pulse: 74   Weight: 86.3 kg (190 lb 3.2 oz)   Height: 1.753 m (5' 9\")                Physical Exam:  Patient is deaf  GENERAL APPEARANCE: in no acute distress.  CHEST: Symmetric and non-tender.  INTEGUMENT: Skin warm and dry  HEENT: No gross abnormalities identified.No pallor or " scleral icterus.  NECK: Supple, no JVD, no bruit.   NEURO/PSHCY: Alert and oriented x3; appropriate behavior and responses and responses  LUNGS: Clear to auscultation bilaterally; normal respiratory effort.  HEART: Rate and rhythm regular with no evident murmur; no gallop appreciated.   ABDOMEN: Soft, non tender.  MUSCULOSKELETAL: No gross deformities.  EXTREMITIES: Warm  There is no edema noted.    Meds:  Current Outpatient Medications   Medication Instructions    aspirin 81 mg EC tablet 1 tablet, Daily    atorvastatin (LIPITOR) 40 mg, oral, Daily    clopidogrel (PLAVIX) 75 mg, oral, Daily    isosorbide mononitrate ER (IMDUR) 30 mg, oral, Daily, Do not crush or chew.    metoprolol succinate XL (TOPROL-XL) 25 mg, oral, Daily    nitroglycerin (Nitrostat) 0.4 mg SL tablet Every 5 min PRN          No Known Allergies          LABS:    Lab Results   Component Value Date    WBC 8.0 02/07/2024    HGB 15.6 02/07/2024    HCT 47.2 02/07/2024     02/07/2024     02/10/2024    K 4.1 02/10/2024     02/10/2024    CREATININE 1.44 (H) 02/10/2024    BUN 16 02/10/2024    CO2 24 02/10/2024    INR 1.1 02/07/2024                 GFR 62 potassium 3.6 glucose 117      Patient Active Problem List    Diagnosis Date Noted    CAD S/P percutaneous coronary angioplasty 02/09/2024    Hard of hearing 02/12/2025    Stage 2 chronic kidney disease 02/12/2025    Non compliance w medication regimen 02/12/2025    Shortness of breath 02/07/2024    Crescendo angina (Multi) 02/07/2024    Stented coronary artery 02/07/2024                 Assessment:    1. CAD S/P percutaneous coronary angioplasty  Follow Up In Cardiology    Follow Up In Cardiology      2. Stented coronary artery  Follow Up In Cardiology    Follow Up In Cardiology      3. Hard of hearing  Follow Up In Cardiology      4. Stage 2 chronic kidney disease  Follow Up In Cardiology      5. Non compliance w medication regimen  Follow Up In Cardiology         Patient is doing  great clinically.  He refuses to take any additional medications, because he just feels so good.  He does not want additional laboratory data or testing.  He understands that he needs to seek prompt medical attention if he develops any symptoms.  Follow up : 1 year        Provider Attestation - Scribe documentation    All medical record entries made by the Scribe were at my direction and personally dictated by me. I have reviewed the chart and agree that the record accurately reflects my personal performance of the history, physical exam, discussion and plan.

## 2025-08-03 ENCOUNTER — HOSPITAL ENCOUNTER (EMERGENCY)
Age: 85
Discharge: HOME | End: 2025-08-03
Payer: MEDICARE

## 2025-08-03 ENCOUNTER — APPOINTMENT (OUTPATIENT)
Dept: CT IMAGING | Age: 85
End: 2025-08-03
Payer: MEDICARE

## 2025-08-03 VITALS
RESPIRATION RATE: 18 BRPM | SYSTOLIC BLOOD PRESSURE: 163 MMHG | BODY MASS INDEX: 27.85 KG/M2 | OXYGEN SATURATION: 98 % | HEART RATE: 78 BPM | WEIGHT: 188 LBS | DIASTOLIC BLOOD PRESSURE: 84 MMHG | TEMPERATURE: 97.3 F | HEIGHT: 69 IN

## 2025-08-03 DIAGNOSIS — H66.012 NON-RECURRENT ACUTE SUPPURATIVE OTITIS MEDIA OF LEFT EAR WITH SPONTANEOUS RUPTURE OF TYMPANIC MEMBRANE: Primary | ICD-10-CM

## 2025-08-03 DIAGNOSIS — H61.23 BILATERAL IMPACTED CERUMEN: ICD-10-CM

## 2025-08-03 PROCEDURE — 99284 EMERGENCY DEPT VISIT MOD MDM: CPT

## 2025-08-03 PROCEDURE — 6370000000 HC RX 637 (ALT 250 FOR IP)

## 2025-08-03 PROCEDURE — 70450 CT HEAD/BRAIN W/O DYE: CPT

## 2025-08-03 RX ORDER — ACETAMINOPHEN 325 MG/1
650 TABLET ORAL ONCE
Status: COMPLETED | OUTPATIENT
Start: 2025-08-03 | End: 2025-08-03

## 2025-08-03 RX ADMIN — AMOXICILLIN AND CLAVULANATE POTASSIUM 1 TABLET: 875; 125 TABLET, FILM COATED ORAL at 15:20

## 2025-08-03 RX ADMIN — ACETAMINOPHEN 650 MG: 325 TABLET ORAL at 15:20

## 2025-08-03 ASSESSMENT — PAIN DESCRIPTION - ORIENTATION: ORIENTATION: LEFT

## 2025-08-03 ASSESSMENT — PAIN - FUNCTIONAL ASSESSMENT: PAIN_FUNCTIONAL_ASSESSMENT: 0-10

## 2025-08-03 ASSESSMENT — LIFESTYLE VARIABLES
HOW MANY STANDARD DRINKS CONTAINING ALCOHOL DO YOU HAVE ON A TYPICAL DAY: PATIENT DOES NOT DRINK
HOW OFTEN DO YOU HAVE A DRINK CONTAINING ALCOHOL: NEVER

## 2025-08-03 ASSESSMENT — PAIN DESCRIPTION - DESCRIPTORS: DESCRIPTORS: ACHING

## 2025-08-03 ASSESSMENT — PAIN DESCRIPTION - FREQUENCY: FREQUENCY: CONTINUOUS

## 2025-08-03 ASSESSMENT — PAIN SCALES - GENERAL: PAINLEVEL_OUTOF10: 10

## 2025-08-03 ASSESSMENT — PAIN DESCRIPTION - LOCATION: LOCATION: EAR;HEAD

## 2025-08-03 ASSESSMENT — PAIN DESCRIPTION - PAIN TYPE: TYPE: ACUTE PAIN;CHRONIC PAIN

## 2025-08-12 ENCOUNTER — OFFICE VISIT (OUTPATIENT)
Dept: OTOLARYNGOLOGY | Facility: CLINIC | Age: 85
End: 2025-08-12
Payer: MEDICARE

## 2025-08-12 ENCOUNTER — APPOINTMENT (OUTPATIENT)
Dept: CARDIOLOGY | Facility: CLINIC | Age: 85
End: 2025-08-12
Payer: MEDICARE

## 2025-08-12 VITALS — WEIGHT: 188 LBS | HEIGHT: 70 IN | BODY MASS INDEX: 26.92 KG/M2

## 2025-08-12 DIAGNOSIS — H60.502 ACUTE NONINFECTIVE OTITIS EXTERNA OF LEFT EAR, UNSPECIFIED TYPE: Primary | ICD-10-CM

## 2025-08-12 RX ORDER — PREDNISONE 20 MG/1
TABLET ORAL
Qty: 9 TABLET | Refills: 0 | Status: SHIPPED | OUTPATIENT
Start: 2025-08-12 | End: 2025-08-19

## 2025-08-12 RX ORDER — CIPROFLOXACIN AND DEXAMETHASONE 3; 1 MG/ML; MG/ML
4 SUSPENSION/ DROPS AURICULAR (OTIC) 2 TIMES DAILY
Qty: 7.5 ML | Refills: 0 | Status: SHIPPED | OUTPATIENT
Start: 2025-08-12 | End: 2025-08-26

## 2025-08-16 ASSESSMENT — ENCOUNTER SYMPTOMS
NAUSEA: 0
FACIAL SWELLING: 0
COLOR CHANGE: 0
RHINORRHEA: 0
SORE THROAT: 0
VOMITING: 0
SHORTNESS OF BREATH: 0

## 2025-08-24 ENCOUNTER — HOSPITAL ENCOUNTER (EMERGENCY)
Age: 85
Discharge: HOME | End: 2025-08-24
Payer: MEDICARE

## 2025-08-24 ENCOUNTER — APPOINTMENT (OUTPATIENT)
Dept: GENERAL RADIOLOGY | Age: 85
End: 2025-08-24
Payer: MEDICARE

## 2025-08-24 VITALS
OXYGEN SATURATION: 94 % | TEMPERATURE: 97.5 F | HEART RATE: 72 BPM | HEIGHT: 69 IN | WEIGHT: 185.2 LBS | BODY MASS INDEX: 27.43 KG/M2 | DIASTOLIC BLOOD PRESSURE: 93 MMHG | SYSTOLIC BLOOD PRESSURE: 178 MMHG | RESPIRATION RATE: 15 BRPM

## 2025-08-24 DIAGNOSIS — T63.441A BEE STING, ACCIDENTAL OR UNINTENTIONAL, INITIAL ENCOUNTER: Primary | ICD-10-CM

## 2025-08-24 LAB
ALBUMIN SERPL-MCNC: 4.9 G/DL (ref 3.5–4.6)
ALP SERPL-CCNC: 86 U/L (ref 35–104)
ALT SERPL-CCNC: 10 U/L (ref 0–41)
ANION GAP SERPL CALCULATED.3IONS-SCNC: 14 MEQ/L (ref 9–15)
ANISOCYTOSIS BLD QL SMEAR: ABNORMAL
AST SERPL-CCNC: 17 U/L (ref 0–40)
BASOPHILS # BLD: 0 K/UL (ref 0–0.2)
BASOPHILS NFR BLD: 0.1 %
BILIRUB SERPL-MCNC: 0.6 MG/DL (ref 0.2–0.7)
BUN SERPL-MCNC: 16 MG/DL (ref 8–23)
CALCIUM SERPL-MCNC: 9.4 MG/DL (ref 8.5–9.9)
CHLORIDE SERPL-SCNC: 101 MEQ/L (ref 95–107)
CO2 SERPL-SCNC: 25 MEQ/L (ref 20–31)
CREAT SERPL-MCNC: 1.28 MG/DL (ref 0.7–1.2)
EOSINOPHIL # BLD: 0 K/UL (ref 0–0.7)
EOSINOPHIL NFR BLD: 0.1 %
ERYTHROCYTE [DISTWIDTH] IN BLOOD BY AUTOMATED COUNT: 14.5 % (ref 11.5–14.5)
GLOBULIN SER CALC-MCNC: 2.7 G/DL (ref 2.3–3.5)
GLUCOSE SERPL-MCNC: 111 MG/DL (ref 70–99)
HCT VFR BLD AUTO: 47.6 % (ref 42–52)
HGB BLD-MCNC: 16 G/DL (ref 14–18)
LYMPHOCYTES # BLD: 1.2 K/UL (ref 1–4.8)
LYMPHOCYTES NFR BLD: 13 %
MCH RBC QN AUTO: 29.5 PG (ref 27–31.3)
MCHC RBC AUTO-ENTMCNC: 33.6 % (ref 33–37)
MCV RBC AUTO: 87.7 FL (ref 79–92.2)
MONOCYTES # BLD: 1.5 K/UL (ref 0.2–0.8)
MONOCYTES NFR BLD: 18.5 %
NEUTROPHILS # BLD: 5.2 K/UL (ref 1.4–6.5)
NEUTS SEG NFR BLD: 67 %
PLATELET # BLD AUTO: 136 K/UL (ref 130–400)
PLATELET BLD QL SMEAR: ADEQUATE
POIKILOCYTOSIS BLD QL SMEAR: ABNORMAL
POTASSIUM SERPL-SCNC: 4.7 MEQ/L (ref 3.4–4.9)
PROT SERPL-MCNC: 7.6 G/DL (ref 6.3–8)
RBC # BLD AUTO: 5.43 M/UL (ref 4.7–6.1)
SLIDE REVIEW: ABNORMAL
SMUDGE CELLS BLD QL SMEAR: 5.8
SODIUM SERPL-SCNC: 140 MEQ/L (ref 135–144)
TROPONIN, HIGH SENSITIVITY: 42 NG/L (ref 0–19)
TROPONIN, HIGH SENSITIVITY: 44 NG/L (ref 0–19)
VARIANT LYMPHS NFR BLD: 2 %
WBC # BLD AUTO: 7.7 K/UL (ref 4.8–10.8)

## 2025-08-24 PROCEDURE — 71045 X-RAY EXAM CHEST 1 VIEW: CPT

## 2025-08-24 PROCEDURE — 96376 TX/PRO/DX INJ SAME DRUG ADON: CPT

## 2025-08-24 PROCEDURE — 36415 COLL VENOUS BLD VENIPUNCTURE: CPT

## 2025-08-24 PROCEDURE — 96375 TX/PRO/DX INJ NEW DRUG ADDON: CPT

## 2025-08-24 PROCEDURE — 99285 EMERGENCY DEPT VISIT HI MDM: CPT

## 2025-08-24 PROCEDURE — 6360000002 HC RX W HCPCS: Performed by: PHYSICIAN ASSISTANT

## 2025-08-24 PROCEDURE — 2500000003 HC RX 250 WO HCPCS: Performed by: PHYSICIAN ASSISTANT

## 2025-08-24 PROCEDURE — 80053 COMPREHEN METABOLIC PANEL: CPT

## 2025-08-24 PROCEDURE — 96374 THER/PROPH/DIAG INJ IV PUSH: CPT

## 2025-08-24 PROCEDURE — 93005 ELECTROCARDIOGRAM TRACING: CPT | Performed by: STUDENT IN AN ORGANIZED HEALTH CARE EDUCATION/TRAINING PROGRAM

## 2025-08-24 PROCEDURE — 84484 ASSAY OF TROPONIN QUANT: CPT

## 2025-08-24 PROCEDURE — 85025 COMPLETE CBC W/AUTO DIFF WBC: CPT

## 2025-08-24 RX ORDER — PREDNISONE 10 MG/1
40 TABLET ORAL DAILY
Qty: 20 TABLET | Refills: 0 | Status: SHIPPED | OUTPATIENT
Start: 2025-08-24 | End: 2025-08-29

## 2025-08-24 RX ORDER — KETOROLAC TROMETHAMINE 15 MG/ML
15 INJECTION, SOLUTION INTRAMUSCULAR; INTRAVENOUS ONCE
Status: COMPLETED | OUTPATIENT
Start: 2025-08-24 | End: 2025-08-24

## 2025-08-24 RX ADMIN — FAMOTIDINE 20 MG: 10 INJECTION, SOLUTION INTRAVENOUS at 21:03

## 2025-08-24 RX ADMIN — BACTERIOSTATIC WATER 40 MG: 1 INJECTION, SOLUTION INTRAMUSCULAR; INTRAVENOUS; SUBCUTANEOUS at 20:58

## 2025-08-24 RX ADMIN — KETOROLAC TROMETHAMINE 15 MG: 15 INJECTION, SOLUTION INTRAMUSCULAR; INTRAVENOUS at 21:01

## 2025-08-24 RX ADMIN — KETOROLAC TROMETHAMINE 15 MG: 15 INJECTION, SOLUTION INTRAMUSCULAR; INTRAVENOUS at 23:39

## 2025-08-24 ASSESSMENT — PAIN SCALES - GENERAL
PAINLEVEL_OUTOF10: 0
PAINLEVEL_OUTOF10: 4
PAINLEVEL_OUTOF10: 10
PAINLEVEL_OUTOF10: 4

## 2025-08-24 ASSESSMENT — PAIN - FUNCTIONAL ASSESSMENT
PAIN_FUNCTIONAL_ASSESSMENT: 0-10

## 2025-08-24 ASSESSMENT — PAIN DESCRIPTION - LOCATION
LOCATION: CHEST
LOCATION: HAND

## 2025-08-24 ASSESSMENT — PAIN DESCRIPTION - DESCRIPTORS: DESCRIPTORS: ACHING

## 2025-08-24 ASSESSMENT — PAIN DESCRIPTION - ORIENTATION: ORIENTATION: RIGHT;LEFT

## 2025-08-25 LAB
EKG ATRIAL RATE: 64 BPM
EKG DIAGNOSIS: NORMAL
EKG P AXIS: 79 DEGREES
EKG P-R INTERVAL: 166 MS
EKG Q-T INTERVAL: 440 MS
EKG QRS DURATION: 80 MS
EKG QTC CALCULATION (BAZETT): 453 MS
EKG R AXIS: 60 DEGREES
EKG T AXIS: 79 DEGREES
EKG VENTRICULAR RATE: 64 BPM

## 2025-08-25 PROCEDURE — 93010 ELECTROCARDIOGRAM REPORT: CPT | Performed by: INTERNAL MEDICINE

## 2025-08-26 ENCOUNTER — APPOINTMENT (OUTPATIENT)
Dept: OTOLARYNGOLOGY | Facility: CLINIC | Age: 85
End: 2025-08-26
Payer: MEDICARE

## 2025-08-26 DIAGNOSIS — H61.22 LEFT EAR IMPACTED CERUMEN: Primary | ICD-10-CM

## 2025-08-26 PROCEDURE — 1160F RVW MEDS BY RX/DR IN RCRD: CPT

## 2025-08-26 PROCEDURE — 69210 REMOVE IMPACTED EAR WAX UNI: CPT

## 2025-08-26 PROCEDURE — 1159F MED LIST DOCD IN RCRD: CPT

## 2025-08-28 ENCOUNTER — APPOINTMENT (OUTPATIENT)
Dept: CARDIOLOGY | Facility: CLINIC | Age: 85
End: 2025-08-28
Payer: MEDICARE

## 2025-08-28 VITALS
DIASTOLIC BLOOD PRESSURE: 60 MMHG | SYSTOLIC BLOOD PRESSURE: 132 MMHG | HEIGHT: 70 IN | BODY MASS INDEX: 26.63 KG/M2 | HEART RATE: 57 BPM | WEIGHT: 186 LBS

## 2025-08-28 DIAGNOSIS — H91.90 HARD OF HEARING: ICD-10-CM

## 2025-08-28 DIAGNOSIS — I25.10 ATHEROSCLEROSIS OF NATIVE CORONARY ARTERY OF NATIVE HEART WITHOUT ANGINA PECTORIS: Primary | ICD-10-CM

## 2025-08-28 DIAGNOSIS — Z53.20 PATIENT REFUSAL OF TREATMENT: ICD-10-CM

## 2025-08-28 DIAGNOSIS — Z91.148 NON COMPLIANCE W MEDICATION REGIMEN: ICD-10-CM

## 2025-08-28 DIAGNOSIS — N18.31 CHRONIC KIDNEY DISEASE, STAGE 3A (MULTI): ICD-10-CM

## 2025-08-28 PROCEDURE — 1159F MED LIST DOCD IN RCRD: CPT | Performed by: INTERNAL MEDICINE

## 2025-08-28 PROCEDURE — G2211 COMPLEX E/M VISIT ADD ON: HCPCS | Performed by: INTERNAL MEDICINE

## 2025-08-28 PROCEDURE — 1160F RVW MEDS BY RX/DR IN RCRD: CPT | Performed by: INTERNAL MEDICINE

## 2025-08-28 PROCEDURE — 99214 OFFICE O/P EST MOD 30 MIN: CPT | Performed by: INTERNAL MEDICINE

## 2025-08-30 PROBLEM — Z53.20 PATIENT REFUSAL OF TREATMENT: Status: ACTIVE | Noted: 2025-08-30

## (undated) DEVICE — CLOSURE DEVICE, VASCULAR, ANGIO-SEAL, VIP, 6FR, LF

## (undated) DEVICE — CATHETER, VISTA BRIGHT TIP, 6FR 90CM, JR 4

## (undated) DEVICE — CATHETER, DIAGNOSTIC, 5FR,  PIG-145, 110CM, 6SH ANGLED

## (undated) DEVICE — TUBING, MANIFOLD, LOW PRESSURE

## (undated) DEVICE — CATHETER, BALLOON DILATION, EUPHORA SEMICOMPLIANT 2.50  X 12 MM X 142CM

## (undated) DEVICE — SYRINGE, ANGIOGRAPHIC, MULTIDOSE

## (undated) DEVICE — CATHETER, INFINITI DIAGNOSTIC, 5 FR 100CM 3DRC, WILLIAMS RIGHT OR NO TORQUE

## (undated) DEVICE — ACCESS KIT, S-MAK MINI, 5FR 10CM 0.018IN 40CM, SS/SS, ECHO ENHANCE NEEDLE

## (undated) DEVICE — HEMOSTASIS VALVE KIT

## (undated) DEVICE — SHEATH, PINNACLE, W/.038 GW 10CM, 5FR INTRODUCER, 2.5 CM DIALATOR

## (undated) DEVICE — SHEATH, PINNACLE, W/.038 GUIDEWIRE, 10 CM,  6FR INTRODUCER, 6FR DIA, 2.5 CM DIALATOR

## (undated) DEVICE — GUIDEWIRE, RUN THROUGH WIRE, 180CM

## (undated) DEVICE — CATHETER, DIAGNOSTIC, JUDKINS, LEFT, 5 FR-JL 4.0

## (undated) DEVICE — INFLATION DEVICE, EVEREST 3-WAY STOPCOCK, 30ATM, DISP